# Patient Record
Sex: MALE | Race: WHITE | Employment: FULL TIME | URBAN - METROPOLITAN AREA
[De-identification: names, ages, dates, MRNs, and addresses within clinical notes are randomized per-mention and may not be internally consistent; named-entity substitution may affect disease eponyms.]

---

## 2017-01-06 DIAGNOSIS — N52.9 ERECTILE DYSFUNCTION, UNSPECIFIED ERECTILE DYSFUNCTION TYPE: ICD-10-CM

## 2017-01-09 RX ORDER — SILDENAFIL 100 MG/1
100 TABLET, FILM COATED ORAL AS NEEDED
Qty: 3 TABLET | Refills: 3 | Status: SHIPPED | OUTPATIENT
Start: 2017-01-09 | End: 2017-02-13

## 2017-01-09 NOTE — TELEPHONE ENCOUNTER
From: Monik Zacarias  To: Luisito Chiu MD  Sent: 1/6/2017 6:01 AM CST  Subject: Medication Renewal Request    Original authorizing provider: MD Monik Rao would like a refill of the following medications:  Sildenafil Citrate (VIAGRA) 100 M

## 2017-02-11 DIAGNOSIS — N52.9 ERECTILE DYSFUNCTION, UNSPECIFIED ERECTILE DYSFUNCTION TYPE: ICD-10-CM

## 2017-02-13 RX ORDER — SILDENAFIL 100 MG/1
100 TABLET, FILM COATED ORAL AS NEEDED
Qty: 3 TABLET | Refills: 3 | Status: SHIPPED | OUTPATIENT
Start: 2017-02-13 | End: 2017-04-10

## 2017-02-13 NOTE — TELEPHONE ENCOUNTER
From: Wilmer Guerrier  To: Adali Figueroa MD  Sent: 2/11/2017 10:49 AM CST  Subject: Medication Renewal Request    Original authorizing provider: MD Wilmer Regalado would like a refill of the following medications:  Sildenafil Citrate (VIAGRA) 100

## 2017-04-09 DIAGNOSIS — N52.9 ERECTILE DYSFUNCTION, UNSPECIFIED ERECTILE DYSFUNCTION TYPE: ICD-10-CM

## 2017-04-10 RX ORDER — SILDENAFIL 100 MG/1
100 TABLET, FILM COATED ORAL AS NEEDED
Qty: 3 TABLET | Refills: 3 | Status: SHIPPED | OUTPATIENT
Start: 2017-04-10 | End: 2017-07-22

## 2017-04-10 NOTE — TELEPHONE ENCOUNTER
From: Marisa Raymundo  To: Vidal Cyr MD  Sent: 4/9/2017 3:13 PM CDT  Subject: Medication Renewal Request    Original authorizing provider: MD Marisa Marquez would like a refill of the following medications:  Sildenafil Citrate (VIAGRA) 100 M

## 2017-07-22 DIAGNOSIS — N52.9 ERECTILE DYSFUNCTION, UNSPECIFIED ERECTILE DYSFUNCTION TYPE: ICD-10-CM

## 2017-07-24 NOTE — TELEPHONE ENCOUNTER
From: Devorah Quispe  Sent: 7/22/2017 10:03 AM CDT  Subject: Medication Renewal Request    Devorah Quispe would like a refill of the following medications:  Sildenafil Citrate (VIAGRA) 100 MG Oral Tab Sanna Raymundo MD]    Preferred pharmacy: [facility]

## 2017-07-25 DIAGNOSIS — N52.9 ERECTILE DYSFUNCTION, UNSPECIFIED ERECTILE DYSFUNCTION TYPE: ICD-10-CM

## 2017-07-25 RX ORDER — SILDENAFIL 100 MG/1
100 TABLET, FILM COATED ORAL AS NEEDED
Qty: 3 TABLET | Refills: 3 | Status: SHIPPED
Start: 2017-07-25 | End: 2017-09-04

## 2017-07-25 NOTE — TELEPHONE ENCOUNTER
From: Alyssa Laurent  Sent: 7/25/2017 4:49 PM CDT  Subject: Medication Renewal Request    Alyssa Dee Deeadonay would like a refill of the following medications:  Sildenafil Citrate (VIAGRA) 100 MG Oral Tab Donal Delgadillo MD]    Preferred pharmacy: [facility]

## 2017-07-26 RX ORDER — SILDENAFIL 100 MG/1
100 TABLET, FILM COATED ORAL AS NEEDED
Qty: 3 TABLET | Refills: 3
Start: 2017-07-26

## 2017-07-26 NOTE — TELEPHONE ENCOUNTER
Get prior auth form. Harris Spencer. Jalen Hopper MD  Diplomate, American Board of Internal Medicine  [facility]  [address]  [facility],4Th Floor, [address], [address]  T: [phone]; F: [address]

## 2017-08-03 ENCOUNTER — TELEPHONE (OUTPATIENT)
Dept: INTERNAL MEDICINE CLINIC | Facility: CLINIC | Age: 52
End: 2017-08-03

## 2017-09-04 DIAGNOSIS — N52.9 ERECTILE DYSFUNCTION, UNSPECIFIED ERECTILE DYSFUNCTION TYPE: ICD-10-CM

## 2017-09-05 RX ORDER — SILDENAFIL 100 MG/1
100 TABLET, FILM COATED ORAL AS NEEDED
Qty: 8 TABLET | Refills: 3 | Status: SHIPPED
Start: 2017-09-05 | End: 2018-02-17

## 2017-09-05 NOTE — TELEPHONE ENCOUNTER
From: Baljit Howell  Sent: 9/4/2017 7:49 AM CDT  Subject: Medication Renewal Request    Baljit Howell would like a refill of the following medications:  Sildenafil Citrate (VIAGRA) 100 MG Oral Tab Chika Muniz MD]    Preferred pharmacy: [facility]

## 2017-12-10 DIAGNOSIS — N52.9 ERECTILE DYSFUNCTION, UNSPECIFIED ERECTILE DYSFUNCTION TYPE: ICD-10-CM

## 2017-12-10 RX ORDER — SILDENAFIL 100 MG/1
100 TABLET, FILM COATED ORAL AS NEEDED
Qty: 8 TABLET | Refills: 3
Start: 2017-12-10

## 2018-02-17 DIAGNOSIS — N52.9 ERECTILE DYSFUNCTION, UNSPECIFIED ERECTILE DYSFUNCTION TYPE: ICD-10-CM

## 2018-02-19 RX ORDER — SILDENAFIL 100 MG/1
100 TABLET, FILM COATED ORAL AS NEEDED
Qty: 8 TABLET | Refills: 3 | Status: SHIPPED
Start: 2018-02-19 | End: 2018-05-16

## 2018-02-19 NOTE — TELEPHONE ENCOUNTER
From: Lenny Waddell  Sent: 2/17/2018 9:46 AM CST  Subject: Medication Renewal Request    Lenny Waddell would like a refill of the following medications:     Sildenafil Citrate (VIAGRA) 100 MG Oral Tab Johanny Mckenna MD]    Preferred pharmacy: [facility]

## 2018-03-08 ENCOUNTER — TELEPHONE (OUTPATIENT)
Dept: INTERNAL MEDICINE CLINIC | Facility: CLINIC | Age: 53
End: 2018-03-08

## 2018-03-08 NOTE — TELEPHONE ENCOUNTER
Call patient. I'm reviewing his generic Viagra script (Sildenafil).   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  I presume this is way more cost-effective greer

## 2018-03-12 NOTE — TELEPHONE ENCOUNTER
In my outbox. Rosa Smith. Gwendlyn Jeans, MD  Diplomate, American Board of Internal Medicine  [facility]  [address]  [address] [facility],4Th Floor, [address], [facility], [address] [place] [address]  T: [phone]; F: Frances[address] 5

## 2018-04-03 ENCOUNTER — OFFICE VISIT (OUTPATIENT)
Dept: INTERNAL MEDICINE CLINIC | Facility: CLINIC | Age: 53
End: 2018-04-03

## 2018-04-03 ENCOUNTER — LAB ENCOUNTER (OUTPATIENT)
Dept: LAB | Age: 53
End: 2018-04-03
Attending: INTERNAL MEDICINE
Payer: COMMERCIAL

## 2018-04-03 VITALS
RESPIRATION RATE: 12 BRPM | BODY MASS INDEX: 25.99 KG/M2 | HEIGHT: 69.5 IN | OXYGEN SATURATION: 97 % | WEIGHT: 179.5 LBS | DIASTOLIC BLOOD PRESSURE: 80 MMHG | SYSTOLIC BLOOD PRESSURE: 112 MMHG | HEART RATE: 60 BPM | TEMPERATURE: 98 F

## 2018-04-03 DIAGNOSIS — Z00.00 ROUTINE GENERAL MEDICAL EXAMINATION AT A HEALTH CARE FACILITY: Primary | ICD-10-CM

## 2018-04-03 DIAGNOSIS — Z00.00 ROUTINE GENERAL MEDICAL EXAMINATION AT A HEALTH CARE FACILITY: ICD-10-CM

## 2018-04-03 DIAGNOSIS — Z23 NEED FOR TETANUS, DIPHTHERIA, AND ACELLULAR PERTUSSIS (TDAP) VACCINE: ICD-10-CM

## 2018-04-03 DIAGNOSIS — L98.9 SKIN DISORDER: ICD-10-CM

## 2018-04-03 PROCEDURE — 84443 ASSAY THYROID STIM HORMONE: CPT

## 2018-04-03 PROCEDURE — 83036 HEMOGLOBIN GLYCOSYLATED A1C: CPT

## 2018-04-03 PROCEDURE — 80050 GENERAL HEALTH PANEL: CPT

## 2018-04-03 PROCEDURE — 80061 LIPID PANEL: CPT

## 2018-04-03 PROCEDURE — 90471 IMMUNIZATION ADMIN: CPT | Performed by: INTERNAL MEDICINE

## 2018-04-03 PROCEDURE — 36415 COLL VENOUS BLD VENIPUNCTURE: CPT

## 2018-04-03 PROCEDURE — 99396 PREV VISIT EST AGE 40-64: CPT | Performed by: INTERNAL MEDICINE

## 2018-04-03 PROCEDURE — 82306 VITAMIN D 25 HYDROXY: CPT

## 2018-04-03 PROCEDURE — 80053 COMPREHEN METABOLIC PANEL: CPT

## 2018-04-03 PROCEDURE — 81003 URINALYSIS AUTO W/O SCOPE: CPT

## 2018-04-03 PROCEDURE — 90715 TDAP VACCINE 7 YRS/> IM: CPT | Performed by: INTERNAL MEDICINE

## 2018-04-03 NOTE — PROGRESS NOTES
Patient presents with:  Physical      HPI: The pt presents today for male CPX. Doing well. Due for wellness labs. Health goals center around longevity, vitality, and QOL. Review of Systems   Constitutional: Negative. HENT: Negative.     Eyes: Negat DTRs  Psych - nl mood/affect  Skin - diffuse hyperpigmented moles throughout torso      A/P:  Routine general medical examination at a health care facility  (primary encounter diagnosis)  Need for tetanus, diphtheria, and acellular pertussis (tdap) vaccine

## 2018-05-16 DIAGNOSIS — N52.9 ERECTILE DYSFUNCTION, UNSPECIFIED ERECTILE DYSFUNCTION TYPE: ICD-10-CM

## 2018-05-17 RX ORDER — SILDENAFIL 100 MG/1
100 TABLET, FILM COATED ORAL AS NEEDED
Qty: 8 TABLET | Refills: 3 | Status: SHIPPED | OUTPATIENT
Start: 2018-05-17 | End: 2018-12-20

## 2018-05-17 NOTE — TELEPHONE ENCOUNTER
Refill requested: Viagra 100 mg     Failed protocol - No protocol       Last refill: 2/19/18 #8 3R  Relevant Labs: PSA 4/3/18  Last OV / RTC advised: 4/3/18 RTC in 1 year   Appt Scheduled:  No

## 2018-12-20 DIAGNOSIS — N52.9 ERECTILE DYSFUNCTION, UNSPECIFIED ERECTILE DYSFUNCTION TYPE: ICD-10-CM

## 2018-12-20 RX ORDER — SILDENAFIL 100 MG/1
100 TABLET, FILM COATED ORAL AS NEEDED
Qty: 8 TABLET | Refills: 3 | Status: SHIPPED | OUTPATIENT
Start: 2018-12-20 | End: 2019-02-06

## 2018-12-20 NOTE — TELEPHONE ENCOUNTER
Refill requested:   Requested Prescriptions     Pending Prescriptions Disp Refills   • Sildenafil Citrate (VIAGRA) 100 MG Oral Tab 8 tablet 3     Sig: Take 1 tablet (100 mg total) by mouth as needed for Erectile Dysfunction.        Failed protocol    Last r

## 2018-12-26 ENCOUNTER — TELEPHONE (OUTPATIENT)
Dept: INTERNAL MEDICINE CLINIC | Facility: CLINIC | Age: 53
End: 2018-12-26

## 2018-12-26 NOTE — TELEPHONE ENCOUNTER
Kayli Loera     Rx #: [id number]    [employer] is reviewing your PA request. Typically an electronic response will be received within 72 hours. To check for an update later, open this request from your dashboard.     You may close this dialog and return to your d

## 2019-01-02 NOTE — TELEPHONE ENCOUNTER
Request not found in Cover My Meds:    Resubmitted:    Chana Harmon (Key: [id number])   Your information has been submitted to [employer]. [employer] is reviewing the PA request and you will receive an electronic response.  You may check for the updated out

## 2019-01-08 DIAGNOSIS — N52.9 ERECTILE DYSFUNCTION, UNSPECIFIED ERECTILE DYSFUNCTION TYPE: ICD-10-CM

## 2019-01-08 RX ORDER — SILDENAFIL 100 MG/1
100 TABLET, FILM COATED ORAL AS NEEDED
Qty: 8 TABLET | Refills: 3 | Status: CANCELLED | OUTPATIENT
Start: 2019-01-08

## 2019-02-06 DIAGNOSIS — N52.9 ERECTILE DYSFUNCTION, UNSPECIFIED ERECTILE DYSFUNCTION TYPE: ICD-10-CM

## 2019-02-07 RX ORDER — SILDENAFIL 100 MG/1
100 TABLET, FILM COATED ORAL AS NEEDED
Qty: 8 TABLET | Refills: 3 | Status: SHIPPED | OUTPATIENT
Start: 2019-02-07 | End: 2020-02-25

## 2019-02-07 NOTE — TELEPHONE ENCOUNTER
Viagra 100 1 tab prn filled 12-20-18 8 tab with 3 refills     LOV 4-3-18     RTC 1 year or PRN.      Next apt 4-15-19     Labs 4-3-18

## 2019-04-15 ENCOUNTER — LAB ENCOUNTER (OUTPATIENT)
Dept: LAB | Age: 54
End: 2019-04-15
Attending: INTERNAL MEDICINE
Payer: COMMERCIAL

## 2019-04-15 ENCOUNTER — OFFICE VISIT (OUTPATIENT)
Dept: INTERNAL MEDICINE CLINIC | Facility: CLINIC | Age: 54
End: 2019-04-15

## 2019-04-15 VITALS
HEIGHT: 69.5 IN | HEART RATE: 58 BPM | SYSTOLIC BLOOD PRESSURE: 116 MMHG | WEIGHT: 171.5 LBS | TEMPERATURE: 99 F | DIASTOLIC BLOOD PRESSURE: 84 MMHG | RESPIRATION RATE: 16 BRPM | BODY MASS INDEX: 24.83 KG/M2

## 2019-04-15 DIAGNOSIS — Z00.00 ROUTINE GENERAL MEDICAL EXAMINATION AT A HEALTH CARE FACILITY: ICD-10-CM

## 2019-04-15 DIAGNOSIS — Z00.00 ROUTINE GENERAL MEDICAL EXAMINATION AT A HEALTH CARE FACILITY: Primary | ICD-10-CM

## 2019-04-15 PROCEDURE — 85025 COMPLETE CBC W/AUTO DIFF WBC: CPT

## 2019-04-15 PROCEDURE — 81003 URINALYSIS AUTO W/O SCOPE: CPT

## 2019-04-15 PROCEDURE — 36415 COLL VENOUS BLD VENIPUNCTURE: CPT

## 2019-04-15 PROCEDURE — 82306 VITAMIN D 25 HYDROXY: CPT

## 2019-04-15 PROCEDURE — 84443 ASSAY THYROID STIM HORMONE: CPT

## 2019-04-15 PROCEDURE — 80061 LIPID PANEL: CPT

## 2019-04-15 PROCEDURE — 83036 HEMOGLOBIN GLYCOSYLATED A1C: CPT

## 2019-04-15 PROCEDURE — 80053 COMPREHEN METABOLIC PANEL: CPT

## 2019-04-15 PROCEDURE — 99396 PREV VISIT EST AGE 40-64: CPT | Performed by: INTERNAL MEDICINE

## 2019-04-15 NOTE — PROGRESS NOTES
Patient presents with:  CPX      HPI: Franca Phan presents today for male CPX. Doing well. Due for wellness labs. Health goals center around longevity, vitality, and QOL. Review of Systems   All other systems reviewed and are negative.       Patient Active P Check wellness labs. 2. RTC 1 year or PRN. In the meantime, I advised getting a Heart Scan. Paolo Poe verbally agrees to and understands the plan as outlined above.   He was also afforded the time and opportunity to ask questions, which were then answered to

## 2019-08-02 ENCOUNTER — OFFICE VISIT (OUTPATIENT)
Dept: INTERNAL MEDICINE CLINIC | Facility: CLINIC | Age: 54
End: 2019-08-02

## 2019-08-02 VITALS
SYSTOLIC BLOOD PRESSURE: 118 MMHG | RESPIRATION RATE: 16 BRPM | OXYGEN SATURATION: 98 % | HEIGHT: 69.5 IN | BODY MASS INDEX: 24.29 KG/M2 | WEIGHT: 167.75 LBS | DIASTOLIC BLOOD PRESSURE: 78 MMHG | HEART RATE: 85 BPM | TEMPERATURE: 99 F

## 2019-08-02 DIAGNOSIS — J01.00 ACUTE NON-RECURRENT MAXILLARY SINUSITIS: Primary | ICD-10-CM

## 2019-08-02 PROCEDURE — 99213 OFFICE O/P EST LOW 20 MIN: CPT | Performed by: NURSE PRACTITIONER

## 2019-08-02 RX ORDER — AMOXICILLIN 875 MG/1
875 TABLET, COATED ORAL 2 TIMES DAILY
Qty: 14 TABLET | Refills: 0 | Status: SHIPPED | OUTPATIENT
Start: 2019-08-02 | End: 2019-08-09

## 2019-08-02 NOTE — PROGRESS NOTES
CHIEF COMPLAINT:   Patient presents with:  Cold: Tuesday morning woke up with sore throat and coughing up yellow mucous. Mucinex is not helping pt as well as he thought. Pt feels achy  and is sneezing and coughing.  Missed 3 days of work and will need a not no tenderness on palpation of maxillary or frontal sinuses  EYES: conjunctiva clear, EOM intact  EARS: TM's opaque, no bulging, no retraction,+ fluid, bony landmarks visible  NOSE: Nostrils patent, purulent nasal discharge, nasal mucosa edematous  THROAT:

## 2020-02-18 ENCOUNTER — TELEPHONE (OUTPATIENT)
Dept: INTERNAL MEDICINE CLINIC | Facility: CLINIC | Age: 55
End: 2020-02-18

## 2020-02-18 DIAGNOSIS — N52.9 ERECTILE DYSFUNCTION, UNSPECIFIED ERECTILE DYSFUNCTION TYPE: ICD-10-CM

## 2020-02-18 NOTE — TELEPHONE ENCOUNTER
PA request for Sildenafil 100 mg tablets.    Cover My Meds Key: [id number]    Self Pay with [facility] #30 = $19.81 @ [employer] (no membership needed)

## 2020-02-19 NOTE — TELEPHONE ENCOUNTER
Notify pt if he's acceptable w/ this. If so, then please pend medication for my signature. Deja Casiano. Renetta Lopez MD  Diplomate, American Board of Internal Medicine  Member, American College of [facility]  [address]  [address] [place] [address],[address]Th Floor, [address], [facility], [address] [place] [address]  T: [phone]; F: [address]

## 2020-02-26 RX ORDER — SILDENAFIL 100 MG/1
100 TABLET, FILM COATED ORAL AS NEEDED
Qty: 30 TABLET | Refills: 1 | Status: SHIPPED | OUTPATIENT
Start: 2020-02-26 | End: 2022-03-09

## 2020-02-26 NOTE — TELEPHONE ENCOUNTER
Order signed. Erik Richter. Yoanna Montez MD  Diplomate, American Board of Internal Medicine  Member, American College of [address] [facility]  [address]  [address] [facility],4Th Floor, [address], [address], [address]  T: [phone]; F: [address]

## 2020-04-27 ENCOUNTER — TELEPHONE (OUTPATIENT)
Dept: INTERNAL MEDICINE CLINIC | Facility: CLINIC | Age: 55
End: 2020-04-27

## 2020-04-27 NOTE — TELEPHONE ENCOUNTER
Pt started with fever Saturday 100.3 so did not go to work. Sunday 99.8  Today 99.3 cough on and off since Friday . 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  Keeps office door closed and social distancing but no masks wor

## 2020-04-27 NOTE — TELEPHONE ENCOUNTER
Wife called and stated that her  has a slight cough and a fever of 100.3. She stated that they went to [place] [address] to be tested for [address]. Patient stated that they need an order from Dr. Buffy Lagunas. Please advise.

## 2020-04-27 NOTE — TELEPHONE ENCOUNTER
Note written. Rosa Smith. Gwendlyn Jeans, MD  Diplomate, American Board of Internal Medicine  Member, American College of [address] [facility]  [address]  [address] [facility],[address]Th Floor, [address], [facility], [address] [place] [address]  T: [phone]; F: [address]

## 2020-08-14 ENCOUNTER — OFFICE VISIT (OUTPATIENT)
Dept: INTERNAL MEDICINE CLINIC | Facility: CLINIC | Age: 55
End: 2020-08-14

## 2020-08-14 VITALS
DIASTOLIC BLOOD PRESSURE: 78 MMHG | BODY MASS INDEX: 24.9 KG/M2 | HEIGHT: 69.5 IN | RESPIRATION RATE: 16 BRPM | SYSTOLIC BLOOD PRESSURE: 120 MMHG | TEMPERATURE: 99 F | HEART RATE: 78 BPM | WEIGHT: 172 LBS

## 2020-08-14 DIAGNOSIS — Z00.00 ROUTINE GENERAL MEDICAL EXAMINATION AT A HEALTH CARE FACILITY: Primary | ICD-10-CM

## 2020-08-14 DIAGNOSIS — Z12.5 SCREENING PSA (PROSTATE SPECIFIC ANTIGEN): ICD-10-CM

## 2020-08-14 LAB
ALBUMIN SERPL-MCNC: 3.9 G/DL (ref 3.4–5)
ALBUMIN/GLOB SERPL: 1.3 {RATIO} (ref 1–2)
ALP LIVER SERPL-CCNC: 50 U/L (ref 45–117)
ALT SERPL-CCNC: 28 U/L (ref 16–61)
ANION GAP SERPL CALC-SCNC: 4 MMOL/L (ref 0–18)
AST SERPL-CCNC: 19 U/L (ref 15–37)
BASOPHILS # BLD AUTO: 0.05 X10(3) UL (ref 0–0.2)
BASOPHILS NFR BLD AUTO: 0.8 %
BILIRUB SERPL-MCNC: 0.6 MG/DL (ref 0.1–2)
BUN BLD-MCNC: 12 MG/DL (ref 7–18)
BUN/CREAT SERPL: 15 (ref 10–20)
CALCIUM BLD-MCNC: 9.2 MG/DL (ref 8.5–10.1)
CHLORIDE SERPL-SCNC: 107 MMOL/L (ref 98–112)
CHOLEST SMN-MCNC: 187 MG/DL (ref ?–200)
CO2 SERPL-SCNC: 28 MMOL/L (ref 21–32)
COMPLEXED PSA SERPL-MCNC: 0.49 NG/ML (ref ?–4)
CREAT BLD-MCNC: 0.8 MG/DL (ref 0.7–1.3)
DEPRECATED RDW RBC AUTO: 41.8 FL (ref 35.1–46.3)
EOSINOPHIL # BLD AUTO: 0.13 X10(3) UL (ref 0–0.7)
EOSINOPHIL NFR BLD AUTO: 2.2 %
ERYTHROCYTE [DISTWIDTH] IN BLOOD BY AUTOMATED COUNT: 12.7 % (ref 11–15)
EST. AVERAGE GLUCOSE BLD GHB EST-MCNC: 94 MG/DL (ref 68–126)
GLOBULIN PLAS-MCNC: 3.1 G/DL (ref 2.8–4.4)
GLUCOSE BLD-MCNC: 86 MG/DL (ref 70–99)
HBA1C MFR BLD HPLC: 4.9 % (ref ?–5.7)
HCT VFR BLD AUTO: 44.1 % (ref 39–53)
HDLC SERPL-MCNC: 76 MG/DL (ref 40–59)
HGB BLD-MCNC: 14.5 G/DL (ref 13–17.5)
IMM GRANULOCYTES # BLD AUTO: 0.01 X10(3) UL (ref 0–1)
IMM GRANULOCYTES NFR BLD: 0.2 %
LDLC SERPL CALC-MCNC: 105 MG/DL (ref ?–100)
LYMPHOCYTES # BLD AUTO: 1.91 X10(3) UL (ref 1–4)
LYMPHOCYTES NFR BLD AUTO: 32.4 %
M PROTEIN MFR SERPL ELPH: 7 G/DL (ref 6.4–8.2)
MCH RBC QN AUTO: 29.5 PG (ref 26–34)
MCHC RBC AUTO-ENTMCNC: 32.9 G/DL (ref 31–37)
MCV RBC AUTO: 89.8 FL (ref 80–100)
MONOCYTES # BLD AUTO: 0.43 X10(3) UL (ref 0.1–1)
MONOCYTES NFR BLD AUTO: 7.3 %
NEUTROPHILS # BLD AUTO: 3.36 X10 (3) UL (ref 1.5–7.7)
NEUTROPHILS # BLD AUTO: 3.36 X10(3) UL (ref 1.5–7.7)
NEUTROPHILS NFR BLD AUTO: 57.1 %
NONHDLC SERPL-MCNC: 111 MG/DL (ref ?–130)
OSMOLALITY SERPL CALC.SUM OF ELEC: 287 MOSM/KG (ref 275–295)
PATIENT FASTING Y/N/NP: YES
PATIENT FASTING Y/N/NP: YES
PLATELET # BLD AUTO: 276 10(3)UL (ref 150–450)
POTASSIUM SERPL-SCNC: 3.9 MMOL/L (ref 3.5–5.1)
RBC # BLD AUTO: 4.91 X10(6)UL (ref 4.3–5.7)
SODIUM SERPL-SCNC: 139 MMOL/L (ref 136–145)
TRIGL SERPL-MCNC: 32 MG/DL (ref 30–149)
TSI SER-ACNC: 1.39 MIU/ML (ref 0.36–3.74)
VLDLC SERPL CALC-MCNC: 6 MG/DL (ref 0–30)
WBC # BLD AUTO: 5.9 X10(3) UL (ref 4–11)

## 2020-08-14 PROCEDURE — 85025 COMPLETE CBC W/AUTO DIFF WBC: CPT | Performed by: INTERNAL MEDICINE

## 2020-08-14 PROCEDURE — 80061 LIPID PANEL: CPT | Performed by: INTERNAL MEDICINE

## 2020-08-14 PROCEDURE — 99396 PREV VISIT EST AGE 40-64: CPT | Performed by: INTERNAL MEDICINE

## 2020-08-14 PROCEDURE — 83036 HEMOGLOBIN GLYCOSYLATED A1C: CPT | Performed by: INTERNAL MEDICINE

## 2020-08-14 PROCEDURE — 80053 COMPREHEN METABOLIC PANEL: CPT | Performed by: INTERNAL MEDICINE

## 2020-08-14 PROCEDURE — 3078F DIAST BP <80 MM HG: CPT | Performed by: INTERNAL MEDICINE

## 2020-08-14 PROCEDURE — 84443 ASSAY THYROID STIM HORMONE: CPT | Performed by: INTERNAL MEDICINE

## 2020-08-14 PROCEDURE — 3074F SYST BP LT 130 MM HG: CPT | Performed by: INTERNAL MEDICINE

## 2020-08-14 PROCEDURE — 3008F BODY MASS INDEX DOCD: CPT | Performed by: INTERNAL MEDICINE

## 2020-08-14 NOTE — PROGRESS NOTES
Patient presents with:  Physical: Pt had coffee with cream.       HPI: Orlando Muller presents today for male CPX. Stable health. Due for wellness labs. Health goals center around longevity, vitality, and QOL. He is planning on getting a Heart Scan soon.     Review o mood/affect      A/P:  Routine general medical examination at a health care facility  (primary encounter diagnosis)  Screening psa (prostate specific antigen)    1. Male CPX - Stable health. Check wellness labs. 2. HM/Prev - Check PSA.   3. RTC 1 year or P

## 2022-03-09 ENCOUNTER — OFFICE VISIT (OUTPATIENT)
Dept: INTERNAL MEDICINE CLINIC | Facility: CLINIC | Age: 57
End: 2022-03-09
Payer: COMMERCIAL

## 2022-03-09 VITALS
DIASTOLIC BLOOD PRESSURE: 68 MMHG | HEIGHT: 69 IN | SYSTOLIC BLOOD PRESSURE: 118 MMHG | BODY MASS INDEX: 25.42 KG/M2 | OXYGEN SATURATION: 99 % | HEART RATE: 54 BPM | TEMPERATURE: 98 F | WEIGHT: 171.63 LBS | RESPIRATION RATE: 16 BRPM

## 2022-03-09 DIAGNOSIS — E78.00 HYPERCHOLESTEROLEMIA: ICD-10-CM

## 2022-03-09 DIAGNOSIS — Z12.5 SCREENING FOR MALIGNANT NEOPLASM OF PROSTATE: ICD-10-CM

## 2022-03-09 DIAGNOSIS — N52.9 ERECTILE DYSFUNCTION, UNSPECIFIED ERECTILE DYSFUNCTION TYPE: ICD-10-CM

## 2022-03-09 DIAGNOSIS — Z00.00 ENCOUNTER FOR PREVENTATIVE ADULT HEALTH CARE EXAMINATION: Primary | ICD-10-CM

## 2022-03-09 PROCEDURE — 99396 PREV VISIT EST AGE 40-64: CPT | Performed by: INTERNAL MEDICINE

## 2022-03-09 PROCEDURE — 3074F SYST BP LT 130 MM HG: CPT | Performed by: INTERNAL MEDICINE

## 2022-03-09 PROCEDURE — 3078F DIAST BP <80 MM HG: CPT | Performed by: INTERNAL MEDICINE

## 2022-03-09 PROCEDURE — 3008F BODY MASS INDEX DOCD: CPT | Performed by: INTERNAL MEDICINE

## 2022-03-09 RX ORDER — SILDENAFIL 100 MG/1
100 TABLET, FILM COATED ORAL AS NEEDED
Qty: 30 TABLET | Refills: 2 | Status: SHIPPED | OUTPATIENT
Start: 2022-03-09

## 2022-03-09 NOTE — PATIENT INSTRUCTIONS
- Get blood work done at [facility] when fasting (water and medications only for 8 hours)  - Sildenafil refill sent to [facility]  - Per the records you are not due for another colonoscopy until 2025. Will send you a message if we find out otherwise/if you need an earlier colonoscopy. - Follow up in 1 year for physical/chronic issues; follow up earlier as needed. It was a pleasure seeing you in the clinic today. Thank you for choosing the [facility] office for your healthcare needs. Please call at [phone] with any questions or concerns.     Dave Spencer MD

## 2022-03-13 LAB
ABSOLUTE BASOPHILS: 62 CELLS/UL (ref 0–200)
ABSOLUTE EOSINOPHILS: 156 CELLS/UL (ref 15–500)
ABSOLUTE LYMPHOCYTES: 1646 CELLS/UL (ref 850–3900)
ABSOLUTE MONOCYTES: 577 CELLS/UL (ref 200–950)
ABSOLUTE NEUTROPHILS: 5359 CELLS/UL (ref 1500–7800)
ALBUMIN/GLOBULIN RATIO: 1.4 (CALC) (ref 1–2.5)
ALBUMIN: 3.9 G/DL (ref 3.6–5.1)
ALKALINE PHOSPHATASE: 57 U/L (ref 35–144)
ALT: 19 U/L (ref 9–46)
AST: 17 U/L (ref 10–35)
BASOPHILS: 0.8 %
BILIRUBIN, TOTAL: 0.5 MG/DL (ref 0.2–1.2)
BUN: 18 MG/DL (ref 7–25)
CALCIUM: 9.5 MG/DL (ref 8.6–10.3)
CARBON DIOXIDE: 29 MMOL/L (ref 20–32)
CHLORIDE: 105 MMOL/L (ref 98–110)
CHOL/HDLC RATIO: 2.3 (CALC)
CHOLESTEROL, TOTAL: 178 MG/DL
CREATININE: 0.75 MG/DL (ref 0.7–1.33)
EGFR IF AFRICN AM: 119 ML/MIN/1.73M2
EGFR IF NONAFRICN AM: 103 ML/MIN/1.73M2
EOSINOPHILS: 2 %
GLOBULIN: 2.7 G/DL (CALC) (ref 1.9–3.7)
GLUCOSE: 87 MG/DL (ref 65–99)
HDL CHOLESTEROL: 78 MG/DL
HEMATOCRIT: 44 % (ref 38.5–50)
HEMOGLOBIN A1C: 4.8 % OF TOTAL HGB
HEMOGLOBIN: 14.7 G/DL (ref 13.2–17.1)
LDL-CHOLESTEROL: 90 MG/DL (CALC)
LYMPHOCYTES: 21.1 %
MCH: 29.2 PG (ref 27–33)
MCHC: 33.4 G/DL (ref 32–36)
MCV: 87.3 FL (ref 80–100)
MONOCYTES: 7.4 %
MPV: 9.7 FL (ref 7.5–12.5)
NEUTROPHILS: 68.7 %
NON-HDL CHOLESTEROL: 100 MG/DL (CALC)
PLATELET COUNT: 291 THOUSAND/UL (ref 140–400)
POTASSIUM: 5 MMOL/L (ref 3.5–5.3)
PROTEIN, TOTAL: 6.6 G/DL (ref 6.1–8.1)
PSA, TOTAL: 0.53 NG/ML
RDW: 12.7 % (ref 11–15)
RED BLOOD CELL COUNT: 5.04 MILLION/UL (ref 4.2–5.8)
SODIUM: 138 MMOL/L (ref 135–146)
TRIGLYCERIDES: 35 MG/DL
TSH W/REFLEX TO FT4: 1.05 MIU/L (ref 0.4–4.5)
WHITE BLOOD CELL COUNT: 7.8 THOUSAND/UL (ref 3.8–10.8)

## 2022-12-27 ENCOUNTER — OFFICE VISIT (OUTPATIENT)
Dept: INTERNAL MEDICINE CLINIC | Facility: CLINIC | Age: 57
End: 2022-12-27
Payer: COMMERCIAL

## 2022-12-27 VITALS
DIASTOLIC BLOOD PRESSURE: 70 MMHG | HEIGHT: 69 IN | SYSTOLIC BLOOD PRESSURE: 120 MMHG | HEART RATE: 88 BPM | RESPIRATION RATE: 16 BRPM | OXYGEN SATURATION: 98 % | BODY MASS INDEX: 26.27 KG/M2 | WEIGHT: 177.38 LBS | TEMPERATURE: 99 F

## 2022-12-27 DIAGNOSIS — K62.9 RECTAL LESION: Primary | ICD-10-CM

## 2022-12-27 DIAGNOSIS — R19.4 CHANGE IN BOWEL HABITS: ICD-10-CM

## 2022-12-27 PROCEDURE — 3008F BODY MASS INDEX DOCD: CPT | Performed by: INTERNAL MEDICINE

## 2022-12-27 PROCEDURE — 99213 OFFICE O/P EST LOW 20 MIN: CPT | Performed by: INTERNAL MEDICINE

## 2022-12-27 PROCEDURE — 3074F SYST BP LT 130 MM HG: CPT | Performed by: INTERNAL MEDICINE

## 2022-12-27 PROCEDURE — 3078F DIAST BP <80 MM HG: CPT | Performed by: INTERNAL MEDICINE

## 2022-12-27 NOTE — PATIENT INSTRUCTIONS
- You do have a possible skin tag in the right side of the rectal area  - I do also see a small hole in the skin below this  - I would like you to follow up with the GI specialists to discuss these findings as well as your frequent bowel movements. Try [facility] first. They should call you, otherwise if you have not heard from them within a week give them a call:  [address]  [place], [address]  [address]  Phone: [phone]    - If they cannot see you in the next month or two try [facility]:  Dr. Javier Pederson, others  [address]  [address], [address] [address]  [phone]    You could also try [facility]  Dr. Cristina Larson [address]  [place] [address]  [phone]    It was a pleasure seeing you in the clinic today. Thank you for choosing the [facility] office for your healthcare needs. Please call at [phone] with any questions or concerns.     Lizz Caldwell MD

## 2023-02-10 ENCOUNTER — HOSPITAL ENCOUNTER (OUTPATIENT)
Dept: GENERAL RADIOLOGY | Age: 58
Discharge: HOME | End: 2023-02-10
Attending: INTERNAL MEDICINE
Payer: COMMERCIAL

## 2023-02-10 DIAGNOSIS — R19.8 CHANGE IN BOWEL FUNCTION: ICD-10-CM

## 2023-02-10 DIAGNOSIS — R19.5 LOOSE STOOLS: ICD-10-CM

## 2023-02-10 PROBLEM — K64.9 HEMORRHOIDS: Status: ACTIVE | Noted: 2023-02-10

## 2023-02-10 PROCEDURE — 74018 RADEX ABDOMEN 1 VIEW: CPT | Performed by: INTERNAL MEDICINE

## 2023-02-16 PROBLEM — K55.20 ANGIODYSPLASIA OF COLON WITHOUT HEMORRHAGE: Status: ACTIVE | Noted: 2023-02-16

## 2023-02-16 PROBLEM — K92.1 HEMATOCHEZIA: Status: ACTIVE | Noted: 2023-02-16

## 2023-02-16 PROBLEM — R19.4 CHANGE IN BOWEL HABITS: Status: ACTIVE | Noted: 2023-02-16

## 2023-02-16 PROBLEM — R19.7 DIARRHEA: Status: ACTIVE | Noted: 2023-02-16

## 2023-02-16 PROBLEM — K62.89 PROCTITIS: Status: ACTIVE | Noted: 2023-02-16

## 2023-02-16 PROBLEM — K64.8 INTERNAL HEMORRHOIDS: Status: ACTIVE | Noted: 2023-02-16

## 2023-02-23 ENCOUNTER — PATIENT MESSAGE (OUTPATIENT)
Dept: INTERNAL MEDICINE CLINIC | Facility: CLINIC | Age: 58
End: 2023-02-23

## 2023-02-23 DIAGNOSIS — K62.9 RECTAL LESION: Primary | ICD-10-CM

## 2023-02-23 NOTE — TELEPHONE ENCOUNTER
From: Siddhartha Lorenz  To: Anjum King MD  Sent: 2/23/2023 4:08 PM CST  Subject: Follow up     Hi Dr. Nima Christianson,  The results of my colonoscopy have come back. While it did explain many of my symptoms, it did not explain the lump which I originally saw you for. Dr. Shawna Diaz said he was unsure what it was, possibly a boil or ingrown hair. He said I should follow up with you. I wasn't sure how to proceed from here. Should I make another appointment with you, or do you need to refer me to a different type of specialist? Please let me know what I should do.    Thank you,   Raymond Yi

## 2023-03-08 ENCOUNTER — PATIENT MESSAGE (OUTPATIENT)
Dept: INTERNAL MEDICINE CLINIC | Facility: CLINIC | Age: 58
End: 2023-03-08

## 2023-03-08 ENCOUNTER — TELEPHONE (OUTPATIENT)
Dept: INTERNAL MEDICINE CLINIC | Facility: CLINIC | Age: 58
End: 2023-03-08

## 2023-03-08 ENCOUNTER — APPOINTMENT (OUTPATIENT)
Dept: CT IMAGING | Age: 58
End: 2023-03-08
Attending: NURSE PRACTITIONER
Payer: COMMERCIAL

## 2023-03-08 ENCOUNTER — HOSPITAL ENCOUNTER (OUTPATIENT)
Age: 58
Discharge: HOME | End: 2023-03-08
Payer: COMMERCIAL

## 2023-03-08 VITALS
OXYGEN SATURATION: 99 % | SYSTOLIC BLOOD PRESSURE: 131 MMHG | DIASTOLIC BLOOD PRESSURE: 76 MMHG | HEART RATE: 72 BPM | BODY MASS INDEX: 24 KG/M2 | TEMPERATURE: 98 F | RESPIRATION RATE: 18 BRPM | WEIGHT: 165 LBS

## 2023-03-08 DIAGNOSIS — L03.317 CELLULITIS, GLUTEAL, RIGHT: Primary | ICD-10-CM

## 2023-03-08 LAB
#MXD IC: 1.3 X10ˆ3/UL (ref 0.1–1)
BUN BLD-MCNC: 12 MG/DL (ref 7–18)
CHLORIDE BLD-SCNC: 104 MMOL/L (ref 98–112)
CO2 BLD-SCNC: 25 MMOL/L (ref 21–32)
CREAT BLD-MCNC: 0.7 MG/DL
GFR SERPLBLD BASED ON 1.73 SQ M-ARVRAT: 107 ML/MIN/1.73M2 (ref 60–?)
GLUCOSE BLD-MCNC: 104 MG/DL (ref 70–99)
HCT VFR BLD AUTO: 42.6 %
HCT VFR BLD CALC: 43 %
HGB BLD-MCNC: 14 G/DL
ISTAT IONIZED CALCIUM FOR CHEM 8: 1.25 MMOL/L (ref 1.12–1.32)
LYMPHOCYTES # BLD AUTO: 1.4 X10ˆ3/UL (ref 1–4)
LYMPHOCYTES NFR BLD AUTO: 19.2 %
MCH RBC QN AUTO: 28.8 PG (ref 26–34)
MCHC RBC AUTO-ENTMCNC: 32.9 G/DL (ref 31–37)
MCV RBC AUTO: 87.7 FL (ref 80–100)
MIXED CELL %: 16.7 %
NEUTROPHILS # BLD AUTO: 4.8 X10ˆ3/UL (ref 1.5–7.7)
NEUTROPHILS NFR BLD AUTO: 64.1 %
PLATELET # BLD AUTO: 333 X10ˆ3/UL (ref 150–450)
POTASSIUM BLD-SCNC: 3.9 MMOL/L (ref 3.6–5.1)
RBC # BLD AUTO: 4.86 X10ˆ6/UL
SODIUM BLD-SCNC: 139 MMOL/L (ref 136–145)
WBC # BLD AUTO: 7.5 X10ˆ3/UL (ref 4–11)

## 2023-03-08 PROCEDURE — 96360 HYDRATION IV INFUSION INIT: CPT

## 2023-03-08 PROCEDURE — 99215 OFFICE O/P EST HI 40 MIN: CPT

## 2023-03-08 PROCEDURE — 96361 HYDRATE IV INFUSION ADD-ON: CPT

## 2023-03-08 PROCEDURE — 80047 BASIC METABLC PNL IONIZED CA: CPT

## 2023-03-08 PROCEDURE — 72193 CT PELVIS W/DYE: CPT | Performed by: NURSE PRACTITIONER

## 2023-03-08 PROCEDURE — 99205 OFFICE O/P NEW HI 60 MIN: CPT

## 2023-03-08 PROCEDURE — 85025 COMPLETE CBC W/AUTO DIFF WBC: CPT | Performed by: NURSE PRACTITIONER

## 2023-03-08 RX ORDER — SODIUM CHLORIDE 9 MG/ML
1000 INJECTION, SOLUTION INTRAVENOUS ONCE
Status: COMPLETED | OUTPATIENT
Start: 2023-03-08 | End: 2023-03-08

## 2023-03-08 RX ORDER — AMOXICILLIN AND CLAVULANATE POTASSIUM 875; 125 MG/1; MG/1
1 TABLET, FILM COATED ORAL 2 TIMES DAILY
Qty: 20 TABLET | Refills: 0 | Status: SHIPPED | OUTPATIENT
Start: 2023-03-08 | End: 2023-03-13

## 2023-03-08 NOTE — TELEPHONE ENCOUNTER
From: Juan Alberto   To: David Carlisle MD  Sent: 3/8/2023 2:02 PM CST  Subject: [place]-rectal surgeon referral     Hi Dr. Vera Counts,   I need to make an appointment with Dr. Halley Wallace, however I called my insurance company to check on the referral that you gave me for him on February 27th, and according to them there is no referral on file. I had to go to urgent care today, as the lump near my anus ruptured and was bleeding. I had a CT scan and it was diagnosed as cellulitis. The urgent care doctor wants me to follow up with the surgeon as soon as possible. She spoke to Carloz Adbul from your office today, who told her that the referral is in the system. However, according to [facility], that is not the case. Could you check to see if the referral was submitted properly? We have had problems in the past with [facility] not accepting referrals that were not submitted through their electronic portal. Thank you very much.    Madison Wills

## 2023-03-08 NOTE — ED INITIAL ASSESSMENT (HPI)
Right buttock- C/o bump on the anal area, states it ruptured this morning and is bleeding. Per pt it started around December. Pt called his GI doctor and was advised to see . pt waiting for approval to see the surgeon.  Pt has appt with GI doctor on Friday

## 2023-03-08 NOTE — TELEPHONE ENCOUNTER
Polina Nunez from [facility] called in stating she was treating pt today for rectal lesion. She stated abscess is large and draining now. She was asking if referral for GI, Dr. Steven Nguyễn, was authorized. Informed her it was authorized and effective 2/27/23 to 2/27/24. She stated she will tell him to be seen by them ASAP and if he has any issues getting into them, she will have him contact us.      HARRISON

## 2023-03-08 NOTE — DISCHARGE INSTRUCTIONS
Antibiotic as directed. Take a probiotic as well. Close follow-up with surgery. Warm soaks to area every 2-3 hours for 15 to 20 minutes. If any fever, increased swelling, or worsening symptoms go to the emergency room.

## 2023-03-09 ENCOUNTER — TELEPHONE (OUTPATIENT)
Facility: LOCATION | Age: 58
End: 2023-03-09

## 2023-03-09 NOTE — TELEPHONE ENCOUNTER
Called patient, left message to call the office to make a follow up appointment with Dr. Reynaldo Marx.

## 2023-03-10 ENCOUNTER — HOSPITAL ENCOUNTER (INPATIENT)
Facility: HOSPITAL | Age: 58
LOS: 3 days | Discharge: HOME | End: 2023-03-13
Attending: EMERGENCY MEDICINE | Admitting: STUDENT IN AN ORGANIZED HEALTH CARE EDUCATION/TRAINING PROGRAM
Payer: COMMERCIAL

## 2023-03-10 DIAGNOSIS — K51.90 EXACERBATION OF ULCERATIVE COLITIS WITHOUT COMPLICATION (HCC): Primary | ICD-10-CM

## 2023-03-10 LAB
ALBUMIN SERPL-MCNC: 3.4 G/DL (ref 3.4–5)
ALBUMIN/GLOB SERPL: 0.9 {RATIO} (ref 1–2)
ALP LIVER SERPL-CCNC: 65 U/L
ALT SERPL-CCNC: 27 U/L
ANION GAP SERPL CALC-SCNC: 2 MMOL/L (ref 0–18)
AST SERPL-CCNC: 16 U/L (ref 15–37)
BASOPHILS # BLD AUTO: 0.07 X10(3) UL (ref 0–0.2)
BASOPHILS NFR BLD AUTO: 1 %
BILIRUB SERPL-MCNC: 0.4 MG/DL (ref 0.1–2)
BUN BLD-MCNC: 14 MG/DL (ref 7–18)
C DIFF TOX B STL QL: NEGATIVE
CALCIUM BLD-MCNC: 9.2 MG/DL (ref 8.5–10.1)
CHLORIDE SERPL-SCNC: 109 MMOL/L (ref 98–112)
CO2 SERPL-SCNC: 27 MMOL/L (ref 21–32)
CREAT BLD-MCNC: 0.88 MG/DL
CRP SERPL-MCNC: 4.4 MG/DL (ref ?–0.3)
EOSINOPHIL # BLD AUTO: 0.33 X10(3) UL (ref 0–0.7)
EOSINOPHIL NFR BLD AUTO: 4.9 %
ERYTHROCYTE [DISTWIDTH] IN BLOOD BY AUTOMATED COUNT: 13 %
GFR SERPLBLD BASED ON 1.73 SQ M-ARVRAT: 100 ML/MIN/1.73M2 (ref 60–?)
GLOBULIN PLAS-MCNC: 3.7 G/DL (ref 2.8–4.4)
GLUCOSE BLD-MCNC: 109 MG/DL (ref 70–99)
HCT VFR BLD AUTO: 45.9 %
HGB BLD-MCNC: 15.5 G/DL
IMM GRANULOCYTES # BLD AUTO: 0.06 X10(3) UL (ref 0–1)
IMM GRANULOCYTES NFR BLD: 0.9 %
LIPASE SERPL-CCNC: 313 U/L (ref 73–393)
LIPASE SERPL-CCNC: 86 U/L (ref 13–75)
LYMPHOCYTES # BLD AUTO: 1.33 X10(3) UL (ref 1–4)
LYMPHOCYTES NFR BLD AUTO: 19.8 %
MCH RBC QN AUTO: 29.3 PG (ref 26–34)
MCHC RBC AUTO-ENTMCNC: 33.8 G/DL (ref 31–37)
MCV RBC AUTO: 86.8 FL
MONOCYTES # BLD AUTO: 0.71 X10(3) UL (ref 0.1–1)
MONOCYTES NFR BLD AUTO: 10.5 %
NEUTROPHILS # BLD AUTO: 4.23 X10 (3) UL (ref 1.5–7.7)
NEUTROPHILS # BLD AUTO: 4.23 X10(3) UL (ref 1.5–7.7)
NEUTROPHILS NFR BLD AUTO: 62.9 %
OSMOLALITY SERPL CALC.SUM OF ELEC: 287 MOSM/KG (ref 275–295)
PLATELET # BLD AUTO: 347 10(3)UL (ref 150–450)
POTASSIUM SERPL-SCNC: 3.8 MMOL/L (ref 3.5–5.1)
PROT SERPL-MCNC: 7.1 G/DL (ref 6.4–8.2)
RBC # BLD AUTO: 5.29 X10(6)UL
SARS-COV-2 RNA RESP QL NAA+PROBE: NOT DETECTED
SODIUM SERPL-SCNC: 138 MMOL/L (ref 136–145)
WBC # BLD AUTO: 6.7 X10(3) UL (ref 4–11)

## 2023-03-10 PROCEDURE — 99223 1ST HOSP IP/OBS HIGH 75: CPT | Performed by: STUDENT IN AN ORGANIZED HEALTH CARE EDUCATION/TRAINING PROGRAM

## 2023-03-10 RX ORDER — POLYETHYLENE GLYCOL 3350 17 G/17G
17 POWDER, FOR SOLUTION ORAL DAILY PRN
Status: DISCONTINUED | OUTPATIENT
Start: 2023-03-10 | End: 2023-03-11

## 2023-03-10 RX ORDER — ECHINACEA PURPUREA EXTRACT 125 MG
1 TABLET ORAL
Status: DISCONTINUED | OUTPATIENT
Start: 2023-03-10 | End: 2023-03-13

## 2023-03-10 RX ORDER — PROCHLORPERAZINE EDISYLATE 5 MG/ML
5 INJECTION INTRAMUSCULAR; INTRAVENOUS EVERY 8 HOURS PRN
Status: DISCONTINUED | OUTPATIENT
Start: 2023-03-10 | End: 2023-03-13

## 2023-03-10 RX ORDER — ONDANSETRON 2 MG/ML
4 INJECTION INTRAMUSCULAR; INTRAVENOUS EVERY 6 HOURS PRN
Status: DISCONTINUED | OUTPATIENT
Start: 2023-03-10 | End: 2023-03-13

## 2023-03-10 RX ORDER — ENOXAPARIN SODIUM 100 MG/ML
40 INJECTION SUBCUTANEOUS DAILY
Status: DISCONTINUED | OUTPATIENT
Start: 2023-03-10 | End: 2023-03-13

## 2023-03-10 RX ORDER — BENZONATATE 200 MG/1
200 CAPSULE ORAL 3 TIMES DAILY PRN
Status: DISCONTINUED | OUTPATIENT
Start: 2023-03-10 | End: 2023-03-13

## 2023-03-10 RX ORDER — BISACODYL 10 MG
10 SUPPOSITORY, RECTAL RECTAL
Status: DISCONTINUED | OUTPATIENT
Start: 2023-03-10 | End: 2023-03-13

## 2023-03-10 RX ORDER — MELATONIN
3 NIGHTLY PRN
Status: DISCONTINUED | OUTPATIENT
Start: 2023-03-10 | End: 2023-03-13

## 2023-03-10 RX ORDER — ACETAMINOPHEN 500 MG
1000 TABLET ORAL EVERY 8 HOURS PRN
Status: DISCONTINUED | OUTPATIENT
Start: 2023-03-10 | End: 2023-03-13

## 2023-03-10 RX ORDER — SENNOSIDES 8.6 MG
17.2 TABLET ORAL NIGHTLY PRN
Status: DISCONTINUED | OUTPATIENT
Start: 2023-03-10 | End: 2023-03-11

## 2023-03-10 RX ORDER — MESALAMINE 1.2 G/1
2.4 TABLET, DELAYED RELEASE ORAL DAILY
Status: DISCONTINUED | OUTPATIENT
Start: 2023-03-11 | End: 2023-03-10 | Stop reason: RX

## 2023-03-10 RX ORDER — SODIUM CHLORIDE, SODIUM LACTATE, POTASSIUM CHLORIDE, CALCIUM CHLORIDE 600; 310; 30; 20 MG/100ML; MG/100ML; MG/100ML; MG/100ML
INJECTION, SOLUTION INTRAVENOUS CONTINUOUS
Status: DISCONTINUED | OUTPATIENT
Start: 2023-03-10 | End: 2023-03-12

## 2023-03-10 RX ORDER — MESALAMINE 400 MG/1
2400 CAPSULE, DELAYED RELEASE ORAL
Status: DISCONTINUED | OUTPATIENT
Start: 2023-03-10 | End: 2023-03-11

## 2023-03-10 NOTE — ED QUICK NOTES
Orders for admission, patient is aware of plan and ready to go upstairs. Any questions, please call ED RN Amanda at extension [phone].      Patient Covid vaccination status: Fully vaccinated     COVID Test Ordered in ED: Rapid SARS-CoV-2 by PCR    COVID Suspicion at Admission: Low clinical suspicion for COVID    Running Infusions:  0.9 NS IVF per [facility]    Mental Status/LOC at time of transport: A&Ox3    Other pertinent information:   CIWA score: N/A   NIH score:  N/A

## 2023-03-10 NOTE — PLAN OF CARE
Problem: Patient/Family Goals  Goal: Patient/Family Long Term Goal  Description: Patient's Long Term Goal: Discharge with adequate resources    Interventions:  - Identify barriers to discharge w/pt and caregiver  - Include patient/family/discharge partner in discharge planning  - Arrange for needed discharge resources and transportation as appropriate  - Identify discharge learning needs   - Consider post-discharge preferences of patient/family/discharge partner  - Assess patient's ability to be responsible for managing their own health  - Refer to Case Management Department for coordinating discharge planning if the patient needs post-hospital services   - See additional Care Plan goals for specific interventions  Outcome: Progressing  Goal: Patient/Family Short Term Goal  Description: Patient's Short Term Goal:   3/10: remain comfortable    Interventions:   - medications per [facility]  - frequent rounding   - See additional Care Plan goals for specific interventions  Outcome: Progressing     Problem: PAIN - ADULT  Goal: Verbalizes/displays adequate comfort level or patient's stated pain goal  Description: INTERVENTIONS:  - Encourage pt to monitor pain and request assistance  - Assess pain using appropriate pain scale  - Administer analgesics based on type and severity of pain and evaluate response  - Implement non-pharmacological measures as appropriate and evaluate response  - Consider cultural and social influences on pain and pain management  - Manage/alleviate anxiety  - Utilize distraction and/or relaxation techniques  - Monitor for opioid side effects  - Notify MD/LIP if interventions unsuccessful or patient reports new pain  - Anticipate increased pain with activity and pre-medicate as appropriate  Outcome: Progressing     Problem: RISK FOR INFECTION - ADULT  Goal: Absence of fever/infection during anticipated neutropenic period  Description: INTERVENTIONS  - Monitor WBC  - Administer growth factors as ordered  - Implement neutropenic guidelines  Outcome: Progressing     Problem: SAFETY ADULT - FALL  Goal: Free from fall injury  Description: INTERVENTIONS:  - Assess pt frequently for physical needs  - Identify cognitive and physical deficits and behaviors that affect risk of falls.   - Sylvester fall precautions as indicated by assessment.  - Educate pt/family on patient safety including physical limitations  - Instruct pt to call for assistance with activity based on assessment  - Modify environment to reduce risk of injury  - Provide assistive devices as appropriate  - Consider OT/PT consult to assist with strengthening/mobility  - Encourage toileting schedule  Outcome: Progressing     Problem: DISCHARGE PLANNING  Goal: Discharge to home or other facility with appropriate resources  Description: INTERVENTIONS:  - Identify barriers to discharge w/pt and caregiver  - Include patient/family/discharge partner in discharge planning  - Arrange for needed discharge resources and transportation as appropriate  - Identify discharge learning needs (meds, wound care, etc)  - Arrange for interpreters to assist at discharge as needed  - Consider post-discharge preferences of patient/family/discharge partner  - Complete POLST form as appropriate  - Assess patient's ability to be responsible for managing their own health  - Refer to Case Management Department for coordinating discharge planning if the patient needs post-hospital services based on physician/LIP order or complex needs related to functional status, cognitive ability or social support system  Outcome: Progressing     Problem: GASTROINTESTINAL - ADULT  Goal: Minimal or absence of nausea and vomiting  Description: INTERVENTIONS:  - Maintain adequate hydration with IV or PO as ordered and tolerated  - Nasogastric tube to low intermittent suction as ordered  - Evaluate effectiveness of ordered antiemetic medications  - Provide nonpharmacologic comfort measures as appropriate  - Advance diet as tolerated, if ordered  - Obtain nutritional consult as needed  - Evaluate fluid balance  Outcome: Progressing  Goal: Maintains or returns to baseline bowel function  Description: INTERVENTIONS:  - Assess bowel function  - Maintain adequate hydration with IV or PO as ordered and tolerated  - Evaluate effectiveness of GI medications  - Encourage mobilization and activity  - Obtain nutritional consult as needed  - Establish a toileting routine/schedule  - Consider collaborating with pharmacy to review patient's medication profile  Outcome: Progressing  Goal: Maintains adequate nutritional intake (undernourished)  Description: INTERVENTIONS:  - Monitor percentage of each meal consumed  - Identify factors contributing to decreased intake, treat as appropriate  - Assist with meals as needed  - Monitor I&O, WT and lab values  - Obtain nutritional consult as needed  - Optimize oral hygiene and moisture  - Encourage food from home; allow for food preferences  - Enhance eating environment  Outcome: Progressing

## 2023-03-10 NOTE — PROGRESS NOTES
NURSING ADMISSION NOTE      Patient admitted via Ambulatory  Oriented to room. Safety precautions initiated. Bed in low position. Call light in reach. Received pt at 1530. Pt is A&Ox4, wears glasses. Room air, continuous pulse oxygen, O2 sating WNL. Tele-NSR. No complaints of pain. Pt reports that when he has to go to the bathroom, cramping occurs but only for a few minutes. SBA. Wife at bedside. MD at bedside. Pt updated on POC, all questions and concerns addressed, Safety precautions in place, no further needs at this time.

## 2023-03-10 NOTE — ED INITIAL ASSESSMENT (HPI)
Patient sent to ER by GI, Dr. Thomas Dozier, for worsening UC symptoms. C/o abdominal cramps, bloody diarrhea, 10-15x/day. Denies dizziness, SOB. Currently on Augmentin.

## 2023-03-11 LAB
ADENOVIRUS F 40/41 PCR: NEGATIVE
ALBUMIN SERPL-MCNC: 2.9 G/DL (ref 3.4–5)
ALBUMIN/GLOB SERPL: 0.9 {RATIO} (ref 1–2)
ALP LIVER SERPL-CCNC: 56 U/L
ALT SERPL-CCNC: 24 U/L
ANION GAP SERPL CALC-SCNC: 4 MMOL/L (ref 0–18)
AST SERPL-CCNC: 11 U/L (ref 15–37)
ASTROVIRUS PCR: NEGATIVE
BASOPHILS # BLD AUTO: 0.07 X10(3) UL (ref 0–0.2)
BASOPHILS NFR BLD AUTO: 1 %
BILIRUB SERPL-MCNC: 0.4 MG/DL (ref 0.1–2)
BUN BLD-MCNC: 12 MG/DL (ref 7–18)
C CAYETANENSIS DNA SPEC QL NAA+PROBE: NEGATIVE
CALCIUM BLD-MCNC: 9.1 MG/DL (ref 8.5–10.1)
CAMPY SP DNA.DIARRHEA STL QL NAA+PROBE: NEGATIVE
CHLORIDE SERPL-SCNC: 109 MMOL/L (ref 98–112)
CO2 SERPL-SCNC: 26 MMOL/L (ref 21–32)
CREAT BLD-MCNC: 0.7 MG/DL
CRP SERPL-MCNC: 2.47 MG/DL (ref ?–0.3)
CRYPTOSP DNA SPEC QL NAA+PROBE: NEGATIVE
EAEC PAA PLAS AGGR+AATA ST NAA+NON-PRB: NEGATIVE
EC STX1+STX2 + H7 FLIC SPEC NAA+PROBE: NEGATIVE
ENTAMOEBA HISTOLYTICA PCR: NEGATIVE
EOSINOPHIL # BLD AUTO: 0.38 X10(3) UL (ref 0–0.7)
EOSINOPHIL NFR BLD AUTO: 5.5 %
EPEC EAE GENE STL QL NAA+NON-PROBE: NEGATIVE
ERYTHROCYTE [DISTWIDTH] IN BLOOD BY AUTOMATED COUNT: 12.8 %
ETEC LTA+ST1A+ST1B TOX ST NAA+NON-PROBE: NEGATIVE
GFR SERPLBLD BASED ON 1.73 SQ M-ARVRAT: 107 ML/MIN/1.73M2 (ref 60–?)
GIARDIA LAMBLIA PCR: NEGATIVE
GLOBULIN PLAS-MCNC: 3.3 G/DL (ref 2.8–4.4)
GLUCOSE BLD-MCNC: 153 MG/DL (ref 70–99)
HAV IGM SER QL: NONREACTIVE
HBV CORE IGM SER QL: NONREACTIVE
HBV SURFACE AB SER QL: NONREACTIVE
HBV SURFACE AB SERPL IA-ACNC: <3.1 MIU/ML
HBV SURFACE AG SERPL QL IA: NONREACTIVE
HCT VFR BLD AUTO: 43.1 %
HCV AB SERPL QL IA: NONREACTIVE
HGB BLD-MCNC: 14.3 G/DL
IMM GRANULOCYTES # BLD AUTO: 0.05 X10(3) UL (ref 0–1)
IMM GRANULOCYTES NFR BLD: 0.7 %
INR BLD: 1.1 (ref 0.85–1.16)
LYMPHOCYTES # BLD AUTO: 1.42 X10(3) UL (ref 1–4)
LYMPHOCYTES NFR BLD AUTO: 20.7 %
MAGNESIUM SERPL-MCNC: 1.9 MG/DL (ref 1.6–2.6)
MCH RBC QN AUTO: 29.1 PG (ref 26–34)
MCHC RBC AUTO-ENTMCNC: 33.2 G/DL (ref 31–37)
MCV RBC AUTO: 87.6 FL
MONOCYTES # BLD AUTO: 0.54 X10(3) UL (ref 0.1–1)
MONOCYTES NFR BLD AUTO: 7.9 %
NEUTROPHILS # BLD AUTO: 4.41 X10 (3) UL (ref 1.5–7.7)
NEUTROPHILS # BLD AUTO: 4.41 X10(3) UL (ref 1.5–7.7)
NEUTROPHILS NFR BLD AUTO: 64.2 %
NOROVIRUS GI/GII PCR: NEGATIVE
OSMOLALITY SERPL CALC.SUM OF ELEC: 291 MOSM/KG (ref 275–295)
P SHIGELLOIDES DNA STL QL NAA+PROBE: NEGATIVE
PHOSPHATE SERPL-MCNC: 2.9 MG/DL (ref 2.5–4.9)
PLATELET # BLD AUTO: 323 10(3)UL (ref 150–450)
POTASSIUM SERPL-SCNC: 3.8 MMOL/L (ref 3.5–5.1)
PROT SERPL-MCNC: 6.2 G/DL (ref 6.4–8.2)
PROTHROMBIN TIME: 14.2 SECONDS (ref 11.6–14.8)
RBC # BLD AUTO: 4.92 X10(6)UL
ROTAVIRUS A PCR: NEGATIVE
SALMONELLA DNA SPEC QL NAA+PROBE: NEGATIVE
SAPOVIRUS PCR: NEGATIVE
SHIGELLA SP+EIEC IPAH ST NAA+NON-PROBE: NEGATIVE
SODIUM SERPL-SCNC: 139 MMOL/L (ref 136–145)
V CHOLERAE DNA SPEC QL NAA+PROBE: NEGATIVE
VIBRIO DNA SPEC NAA+PROBE: NEGATIVE
WBC # BLD AUTO: 6.9 X10(3) UL (ref 4–11)
YERSINIA DNA SPEC NAA+PROBE: NEGATIVE

## 2023-03-11 PROCEDURE — 99232 SBSQ HOSP IP/OBS MODERATE 35: CPT | Performed by: INTERNAL MEDICINE

## 2023-03-11 RX ORDER — MESALAMINE 400 MG/1
2400 CAPSULE, DELAYED RELEASE ORAL
Status: DISCONTINUED | OUTPATIENT
Start: 2023-03-12 | End: 2023-03-13

## 2023-03-11 RX ORDER — METHYLPREDNISOLONE SODIUM SUCCINATE 40 MG/ML
20 INJECTION, POWDER, LYOPHILIZED, FOR SOLUTION INTRAMUSCULAR; INTRAVENOUS EVERY 8 HOURS
Status: DISCONTINUED | OUTPATIENT
Start: 2023-03-11 | End: 2023-03-13

## 2023-03-11 NOTE — PLAN OF CARE
Pt A&Ox4. Glasses at bedside. Room air, . Tele, NSR. Afebrile. Lovenox. IVF. Voids, up ad sarah. C/o of bloody diarrhea d/t UC flare up. Stool panel pending & cdiff(-). C/o of mild abdominal cramping before passing stool but relieves quickly. L buttocks cellulitis noted, site c/d/i. No c/o of sob, n/v. Low fiber/soft diet, tolerating well. Pt updated on po. No further needs at this time. Safety precautions in place. WCTM.        Problem: Patient/Family Goals  Goal: Patient/Family Long Term Goal  Description: Patient's Long Term Goal: Discharge with adequate resources    Interventions:  - Identify barriers to discharge w/pt and caregiver  - Include patient/family/discharge partner in discharge planning  - Arrange for needed discharge resources and transportation as appropriate  - Identify discharge learning needs   - Consider post-discharge preferences of patient/family/discharge partner  - Assess patient's ability to be responsible for managing their own health  - Refer to Case Management Department for coordinating discharge planning if the patient needs post-hospital services   - See additional Care Plan goals for specific interventions  Outcome: Progressing  Goal: Patient/Family Short Term Goal  Description: Patient's Short Term Goal:   3/10: remain comfortable  3/10 NOC: control diarrhea, sleep well   Interventions:   - medications per [facility]  - frequent rounding   - See additional Care Plan goals for specific interventions  Outcome: Progressing     Problem: PAIN - ADULT  Goal: Verbalizes/displays adequate comfort level or patient's stated pain goal  Description: INTERVENTIONS:  - Encourage pt to monitor pain and request assistance  - Assess pain using appropriate pain scale  - Administer analgesics based on type and severity of pain and evaluate response  - Implement non-pharmacological measures as appropriate and evaluate response  - Consider cultural and social influences on pain and pain management  - Manage/alleviate anxiety  - Utilize distraction and/or relaxation techniques  - Monitor for opioid side effects  - Notify MD/LIP if interventions unsuccessful or patient reports new pain  - Anticipate increased pain with activity and pre-medicate as appropriate  Outcome: Progressing     Problem: RISK FOR INFECTION - ADULT  Goal: Absence of fever/infection during anticipated neutropenic period  Description: INTERVENTIONS  - Monitor WBC  - Administer growth factors as ordered  - Implement neutropenic guidelines  Outcome: Progressing     Problem: SAFETY ADULT - FALL  Goal: Free from fall injury  Description: INTERVENTIONS:  - Assess pt frequently for physical needs  - Identify cognitive and physical deficits and behaviors that affect risk of falls.   - [place] fall precautions as indicated by assessment.  - Educate pt/family on patient safety including physical limitations  - Instruct pt to call for assistance with activity based on assessment  - Modify environment to reduce risk of injury  - Provide assistive devices as appropriate  - Consider OT/PT consult to assist with strengthening/mobility  - Encourage toileting schedule  Outcome: Progressing     Problem: DISCHARGE PLANNING  Goal: Discharge to home or other facility with appropriate resources  Description: INTERVENTIONS:  - Identify barriers to discharge w/pt and caregiver  - Include patient/family/discharge partner in discharge planning  - Arrange for needed discharge resources and transportation as appropriate  - Identify discharge learning needs (meds, wound care, etc)  - Arrange for interpreters to assist at discharge as needed  - Consider post-discharge preferences of patient/family/discharge partner  - Complete POLST form as appropriate  - Assess patient's ability to be responsible for managing their own health  - Refer to Case Management Department for coordinating discharge planning if the patient needs post-hospital services based on physician/LIP order or complex needs related to functional status, cognitive ability or social support system  Outcome: Progressing     Problem: GASTROINTESTINAL - ADULT  Goal: Minimal or absence of nausea and vomiting  Description: INTERVENTIONS:  - Maintain adequate hydration with IV or PO as ordered and tolerated  - Nasogastric tube to low intermittent suction as ordered  - Evaluate effectiveness of ordered antiemetic medications  - Provide nonpharmacologic comfort measures as appropriate  - Advance diet as tolerated, if ordered  - Obtain nutritional consult as needed  - Evaluate fluid balance  Outcome: Progressing  Goal: Maintains or returns to baseline bowel function  Description: INTERVENTIONS:  - Assess bowel function  - Maintain adequate hydration with IV or PO as ordered and tolerated  - Evaluate effectiveness of GI medications  - Encourage mobilization and activity  - Obtain nutritional consult as needed  - Establish a toileting routine/schedule  - Consider collaborating with pharmacy to review patient's medication profile  Outcome: Progressing  Goal: Maintains adequate nutritional intake (undernourished)  Description: INTERVENTIONS:  - Monitor percentage of each meal consumed  - Identify factors contributing to decreased intake, treat as appropriate  - Assist with meals as needed  - Monitor I&O, WT and lab values  - Obtain nutritional consult as needed  - Optimize oral hygiene and moisture  - Encourage food from home; allow for food preferences  - Enhance eating environment  Outcome: Progressing

## 2023-03-11 NOTE — PLAN OF CARE
Pt is A&Ox4, wears glasses. Room air, continuous pulse oxygen, O2 sating WNL. Tele-NSR. No complaints of pain. Pt reports abdominal cramping prior to BMs that resolves quickly. Pt reported 11 loose, red/brown stools since midnight. L buttocks cellulitis noted, site C/D/I. Wife at bedside. Pt updated on POC, all questions and concerns addressed, Safety precautions in place, no further needs at this time.     Problem: Patient/Family Goals  Goal: Patient/Family Long Term Goal  Description: Patient's Long Term Goal: Discharge with adequate resources    Interventions:  - Identify barriers to discharge w/pt and caregiver  - Include patient/family/discharge partner in discharge planning  - Arrange for needed discharge resources and transportation as appropriate  - Identify discharge learning needs   - Consider post-discharge preferences of patient/family/discharge partner  - Assess patient's ability to be responsible for managing their own health  - Refer to Case Management Department for coordinating discharge planning if the patient needs post-hospital services   - See additional Care Plan goals for specific interventions  Outcome: Progressing  Goal: Patient/Family Short Term Goal  Description: Patient's Short Term Goal:   3/10: remain comfortable  3/10 NOC: control diarrhea, sleep well   3/11: Control diarrhea, remain comfortable    Interventions:   - medications per [facility]  - frequent rounding   - See additional Care Plan goals for specific interventions  Outcome: Progressing     Problem: PAIN - ADULT  Goal: Verbalizes/displays adequate comfort level or patient's stated pain goal  Description: INTERVENTIONS:  - Encourage pt to monitor pain and request assistance  - Assess pain using appropriate pain scale  - Administer analgesics based on type and severity of pain and evaluate response  - Implement non-pharmacological measures as appropriate and evaluate response  - Consider cultural and social influences on pain and pain management  - Manage/alleviate anxiety  - Utilize distraction and/or relaxation techniques  - Monitor for opioid side effects  - Notify MD/LIP if interventions unsuccessful or patient reports new pain  - Anticipate increased pain with activity and pre-medicate as appropriate  Outcome: Progressing     Problem: RISK FOR INFECTION - ADULT  Goal: Absence of fever/infection during anticipated neutropenic period  Description: INTERVENTIONS  - Monitor WBC  - Administer growth factors as ordered  - Implement neutropenic guidelines  Outcome: Progressing     Problem: SAFETY ADULT - FALL  Goal: Free from fall injury  Description: INTERVENTIONS:  - Assess pt frequently for physical needs  - Identify cognitive and physical deficits and behaviors that affect risk of falls.   - [place] fall precautions as indicated by assessment.  - Educate pt/family on patient safety including physical limitations  - Instruct pt to call for assistance with activity based on assessment  - Modify environment to reduce risk of injury  - Provide assistive devices as appropriate  - Consider OT/PT consult to assist with strengthening/mobility  - Encourage toileting schedule  Outcome: Progressing     Problem: DISCHARGE PLANNING  Goal: Discharge to home or other facility with appropriate resources  Description: INTERVENTIONS:  - Identify barriers to discharge w/pt and caregiver  - Include patient/family/discharge partner in discharge planning  - Arrange for needed discharge resources and transportation as appropriate  - Identify discharge learning needs (meds, wound care, etc)  - Arrange for interpreters to assist at discharge as needed  - Consider post-discharge preferences of patient/family/discharge partner  - Complete POLST form as appropriate  - Assess patient's ability to be responsible for managing their own health  - Refer to Case Management Department for coordinating discharge planning if the patient needs post-hospital services based on physician/LIP order or complex needs related to functional status, cognitive ability or social support system  Outcome: Progressing     Problem: GASTROINTESTINAL - ADULT  Goal: Minimal or absence of nausea and vomiting  Description: INTERVENTIONS:  - Maintain adequate hydration with IV or PO as ordered and tolerated  - Nasogastric tube to low intermittent suction as ordered  - Evaluate effectiveness of ordered antiemetic medications  - Provide nonpharmacologic comfort measures as appropriate  - Advance diet as tolerated, if ordered  - Obtain nutritional consult as needed  - Evaluate fluid balance  Outcome: Progressing  Goal: Maintains or returns to baseline bowel function  Description: INTERVENTIONS:  - Assess bowel function  - Maintain adequate hydration with IV or PO as ordered and tolerated  - Evaluate effectiveness of GI medications  - Encourage mobilization and activity  - Obtain nutritional consult as needed  - Establish a toileting routine/schedule  - Consider collaborating with pharmacy to review patient's medication profile  Outcome: Progressing  Goal: Maintains adequate nutritional intake (undernourished)  Description: INTERVENTIONS:  - Monitor percentage of each meal consumed  - Identify factors contributing to decreased intake, treat as appropriate  - Assist with meals as needed  - Monitor I&O, WT and lab values  - Obtain nutritional consult as needed  - Optimize oral hygiene and moisture  - Encourage food from home; allow for food preferences  - Enhance eating environment  Outcome: Progressing

## 2023-03-12 LAB
ALBUMIN SERPL-MCNC: 3.1 G/DL (ref 3.4–5)
ALBUMIN/GLOB SERPL: 0.8 {RATIO} (ref 1–2)
ALP LIVER SERPL-CCNC: 57 U/L
ALT SERPL-CCNC: 26 U/L
ANION GAP SERPL CALC-SCNC: 7 MMOL/L (ref 0–18)
AST SERPL-CCNC: 12 U/L (ref 15–37)
BILIRUB SERPL-MCNC: 0.3 MG/DL (ref 0.1–2)
BUN BLD-MCNC: 7 MG/DL (ref 7–18)
CALCIUM BLD-MCNC: 9.4 MG/DL (ref 8.5–10.1)
CHLORIDE SERPL-SCNC: 107 MMOL/L (ref 98–112)
CO2 SERPL-SCNC: 26 MMOL/L (ref 21–32)
CREAT BLD-MCNC: 0.69 MG/DL
ERYTHROCYTE [DISTWIDTH] IN BLOOD BY AUTOMATED COUNT: 12.5 %
GFR SERPLBLD BASED ON 1.73 SQ M-ARVRAT: 108 ML/MIN/1.73M2 (ref 60–?)
GLOBULIN PLAS-MCNC: 3.7 G/DL (ref 2.8–4.4)
GLUCOSE BLD-MCNC: 140 MG/DL (ref 70–99)
HCT VFR BLD AUTO: 43 %
HGB BLD-MCNC: 14.9 G/DL
MCH RBC QN AUTO: 29.3 PG (ref 26–34)
MCHC RBC AUTO-ENTMCNC: 34.7 G/DL (ref 31–37)
MCV RBC AUTO: 84.6 FL
OSMOLALITY SERPL CALC.SUM OF ELEC: 290 MOSM/KG (ref 275–295)
PLATELET # BLD AUTO: 361 10(3)UL (ref 150–450)
POTASSIUM SERPL-SCNC: 4 MMOL/L (ref 3.5–5.1)
PROT SERPL-MCNC: 6.8 G/DL (ref 6.4–8.2)
RBC # BLD AUTO: 5.08 X10(6)UL
SODIUM SERPL-SCNC: 140 MMOL/L (ref 136–145)
WBC # BLD AUTO: 5.9 X10(3) UL (ref 4–11)

## 2023-03-12 PROCEDURE — 99232 SBSQ HOSP IP/OBS MODERATE 35: CPT | Performed by: INTERNAL MEDICINE

## 2023-03-12 NOTE — PLAN OF CARE
Problem:Ulcerative Colitis  Data:Pt. A/O x4. VSS. Denies pain. Pt. Reports decreased diarrhea and no blood this am. Abdomen soft, non-distended. Denies nausea. Tolerating diet. IV Solumedrol given as scheduled. Action:Monitor VS and tele. Assessment done. Meds as scheduled. Teaching:All care explained to pt. Pt. Updated with POC. See education flowsheet for details. Response:WIll continue to monitor.   Problem: Patient/Family Goals  Goal: Patient/Family Long Term Goal  Description: Patient's Long Term Goal: Discharge with adequate resources    Interventions:  - Identify barriers to discharge w/pt and caregiver  - Include patient/family/discharge partner in discharge planning  - Arrange for needed discharge resources and transportation as appropriate  - Identify discharge learning needs   - Consider post-discharge preferences of patient/family/discharge partner  - Assess patient's ability to be responsible for managing their own health  - Refer to Case Management Department for coordinating discharge planning if the patient needs post-hospital services   - See additional Care Plan goals for specific interventions  Outcome: Progressing  Goal: Patient/Family Short Term Goal  Description: Patient's Short Term Goal:   3/10: remain comfortable  3/10 NOC: control diarrhea, sleep well   3/11: Control diarrhea, remain comfortable  3/11 NOC: control diarrhea, sleep   3/12 days:decreased diarrhea  Interventions:   - medications per [facility]  - frequent rounding   - See additional Care Plan goals for specific interventions  Outcome: Progressing

## 2023-03-12 NOTE — PLAN OF CARE
Pt A&Ox4. Glasses at bedside. Room air, . Tele, NSR. Afebrile. Lovenox. IVF. IV steroids. Voids, up ad sarah. C/o of bloody diarrhea d/t UC flare up. C/o of mild abdominal cramping before passing stool but relieves quickly. L buttocks cellulitis noted, site c/d/i. No c/o of sob, n/v. Prn melatonin given per pt request to enhance sleep. Low fiber/soft diet, tolerating well. Pt updated on po. No further needs at this time. Safety precautions in place. WCTM.        Problem: Patient/Family Goals  Goal: Patient/Family Long Term Goal  Description: Patient's Long Term Goal: Discharge with adequate resources    Interventions:  - Identify barriers to discharge w/pt and caregiver  - Include patient/family/discharge partner in discharge planning  - Arrange for needed discharge resources and transportation as appropriate  - Identify discharge learning needs   - Consider post-discharge preferences of patient/family/discharge partner  - Assess patient's ability to be responsible for managing their own health  - Refer to Case Management Department for coordinating discharge planning if the patient needs post-hospital services   - See additional Care Plan goals for specific interventions  Outcome: Progressing  Goal: Patient/Family Short Term Goal  Description: Patient's Short Term Goal:   3/10: remain comfortable  3/10 NOC: control diarrhea, sleep well   3/11: Control diarrhea, remain comfortable  3/11 NOC: control diarrhea, sleep   Interventions:   - medications per [facility]  - frequent rounding   - See additional Care Plan goals for specific interventions  Outcome: Progressing     Problem: PAIN - ADULT  Goal: Verbalizes/displays adequate comfort level or patient's stated pain goal  Description: INTERVENTIONS:  - Encourage pt to monitor pain and request assistance  - Assess pain using appropriate pain scale  - Administer analgesics based on type and severity of pain and evaluate response  - Implement non-pharmacological measures as appropriate and evaluate response  - Consider cultural and social influences on pain and pain management  - Manage/alleviate anxiety  - Utilize distraction and/or relaxation techniques  - Monitor for opioid side effects  - Notify MD/LIP if interventions unsuccessful or patient reports new pain  - Anticipate increased pain with activity and pre-medicate as appropriate  Outcome: Progressing     Problem: RISK FOR INFECTION - ADULT  Goal: Absence of fever/infection during anticipated neutropenic period  Description: INTERVENTIONS  - Monitor WBC  - Administer growth factors as ordered  - Implement neutropenic guidelines  Outcome: Progressing     Problem: SAFETY ADULT - FALL  Goal: Free from fall injury  Description: INTERVENTIONS:  - Assess pt frequently for physical needs  - Identify cognitive and physical deficits and behaviors that affect risk of falls.   - [place] fall precautions as indicated by assessment.  - Educate pt/family on patient safety including physical limitations  - Instruct pt to call for assistance with activity based on assessment  - Modify environment to reduce risk of injury  - Provide assistive devices as appropriate  - Consider OT/PT consult to assist with strengthening/mobility  - Encourage toileting schedule  Outcome: Progressing     Problem: DISCHARGE PLANNING  Goal: Discharge to home or other facility with appropriate resources  Description: INTERVENTIONS:  - Identify barriers to discharge w/pt and caregiver  - Include patient/family/discharge partner in discharge planning  - Arrange for needed discharge resources and transportation as appropriate  - Identify discharge learning needs (meds, wound care, etc)  - Arrange for interpreters to assist at discharge as needed  - Consider post-discharge preferences of patient/family/discharge partner  - Complete POLST form as appropriate  - Assess patient's ability to be responsible for managing their own health  - Refer to Case Management Department for coordinating discharge planning if the patient needs post-hospital services based on physician/LIP order or complex needs related to functional status, cognitive ability or social support system  Outcome: Progressing     Problem: GASTROINTESTINAL - ADULT  Goal: Minimal or absence of nausea and vomiting  Description: INTERVENTIONS:  - Maintain adequate hydration with IV or PO as ordered and tolerated  - Nasogastric tube to low intermittent suction as ordered  - Evaluate effectiveness of ordered antiemetic medications  - Provide nonpharmacologic comfort measures as appropriate  - Advance diet as tolerated, if ordered  - Obtain nutritional consult as needed  - Evaluate fluid balance  Outcome: Progressing  Goal: Maintains or returns to baseline bowel function  Description: INTERVENTIONS:  - Assess bowel function  - Maintain adequate hydration with IV or PO as ordered and tolerated  - Evaluate effectiveness of GI medications  - Encourage mobilization and activity  - Obtain nutritional consult as needed  - Establish a toileting routine/schedule  - Consider collaborating with pharmacy to review patient's medication profile  Outcome: Progressing  Goal: Maintains adequate nutritional intake (undernourished)  Description: INTERVENTIONS:  - Monitor percentage of each meal consumed  - Identify factors contributing to decreased intake, treat as appropriate  - Assist with meals as needed  - Monitor I&O, WT and lab values  - Obtain nutritional consult as needed  - Optimize oral hygiene and moisture  - Encourage food from home; allow for food preferences  - Enhance eating environment  Outcome: Progressing

## 2023-03-13 VITALS
WEIGHT: 166 LBS | RESPIRATION RATE: 18 BRPM | HEART RATE: 74 BPM | OXYGEN SATURATION: 99 % | TEMPERATURE: 98 F | BODY MASS INDEX: 25 KG/M2 | DIASTOLIC BLOOD PRESSURE: 77 MMHG | SYSTOLIC BLOOD PRESSURE: 122 MMHG

## 2023-03-13 PROCEDURE — 99239 HOSP IP/OBS DSCHRG MGMT >30: CPT | Performed by: INTERNAL MEDICINE

## 2023-03-13 RX ORDER — PREDNISONE 10 MG/1
TABLET ORAL
Qty: 102 TABLET | Refills: 0 | Status: SHIPPED | OUTPATIENT
Start: 2023-03-13 | End: 2023-04-24

## 2023-03-13 RX ORDER — PREDNISONE 10 MG/1
40 TABLET ORAL DAILY
Qty: 56 TABLET | Refills: 0 | Status: SHIPPED | OUTPATIENT
Start: 2023-03-13 | End: 2023-03-13

## 2023-03-13 NOTE — PLAN OF CARE
3/13 a/ox4, denies any pain nor sob at this time. Up ad sarah. SR on tele. Claimed diarrhea is better, hoping he will be home today.     Problem: Patient/Family Goals  Goal: Patient/Family Long Term Goal  Description: Patient's Long Term Goal: Discharge with adequate resources    Interventions:  - Identify barriers to discharge w/pt and caregiver  - Include patient/family/discharge partner in discharge planning  - Arrange for needed discharge resources and transportation as appropriate  - Identify discharge learning needs   - Consider post-discharge preferences of patient/family/discharge partner  - Assess patient's ability to be responsible for managing their own health  - Refer to Case Management Department for coordinating discharge planning if the patient needs post-hospital services   - See additional Care Plan goals for specific interventions  Outcome: Progressing  Goal: Patient/Family Short Term Goal  Description: Patient's Short Term Goal:   3/10: remain comfortable  3/10 NOC: control diarrhea, sleep well   3/11: Control diarrhea, remain comfortable  3/11 NOC: control diarrhea, sleep   3/12 days:decreased diarrhea  3/12 NOC: decrease diarrhea episodes  Interventions:   - medications per MAR  - frequent rounding   - See additional Care Plan goals for specific interventions  Outcome: Progressing     Problem: PAIN - ADULT  Goal: Verbalizes/displays adequate comfort level or patient's stated pain goal  Description: INTERVENTIONS:  - Encourage pt to monitor pain and request assistance  - Assess pain using appropriate pain scale  - Administer analgesics based on type and severity of pain and evaluate response  - Implement non-pharmacological measures as appropriate and evaluate response  - Consider cultural and social influences on pain and pain management  - Manage/alleviate anxiety  - Utilize distraction and/or relaxation techniques  - Monitor for opioid side effects  - Notify MD/LIP if interventions unsuccessful or patient reports new pain  - Anticipate increased pain with activity and pre-medicate as appropriate  Outcome: Progressing     Problem: RISK FOR INFECTION - ADULT  Goal: Absence of fever/infection during anticipated neutropenic period  Description: INTERVENTIONS  - Monitor WBC  - Administer growth factors as ordered  - Implement neutropenic guidelines  Outcome: Progressing     Problem: SAFETY ADULT - FALL  Goal: Free from fall injury  Description: INTERVENTIONS:  - Assess pt frequently for physical needs  - Identify cognitive and physical deficits and behaviors that affect risk of falls.   - [place] fall precautions as indicated by assessment.  - Educate pt/family on patient safety including physical limitations  - Instruct pt to call for assistance with activity based on assessment  - Modify environment to reduce risk of injury  - Provide assistive devices as appropriate  - Consider OT/PT consult to assist with strengthening/mobility  - Encourage toileting schedule  Outcome: Progressing     Problem: DISCHARGE PLANNING  Goal: Discharge to home or other facility with appropriate resources  Description: INTERVENTIONS:  - Identify barriers to discharge w/pt and caregiver  - Include patient/family/discharge partner in discharge planning  - Arrange for needed discharge resources and transportation as appropriate  - Identify discharge learning needs (meds, wound care, etc)  - Arrange for interpreters to assist at discharge as needed  - Consider post-discharge preferences of patient/family/discharge partner  - Complete POLST form as appropriate  - Assess patient's ability to be responsible for managing their own health  - Refer to Case Management Department for coordinating discharge planning if the patient needs post-hospital services based on physician/LIP order or complex needs related to functional status, cognitive ability or social support system  Outcome: Progressing     Problem: GASTROINTESTINAL - ADULT  Goal: Minimal or absence of nausea and vomiting  Description: INTERVENTIONS:  - Maintain adequate hydration with IV or PO as ordered and tolerated  - Nasogastric tube to low intermittent suction as ordered  - Evaluate effectiveness of ordered antiemetic medications  - Provide nonpharmacologic comfort measures as appropriate  - Advance diet as tolerated, if ordered  - Obtain nutritional consult as needed  - Evaluate fluid balance  Outcome: Progressing  Goal: Maintains or returns to baseline bowel function  Description: INTERVENTIONS:  - Assess bowel function  - Maintain adequate hydration with IV or PO as ordered and tolerated  - Evaluate effectiveness of GI medications  - Encourage mobilization and activity  - Obtain nutritional consult as needed  - Establish a toileting routine/schedule  - Consider collaborating with pharmacy to review patient's medication profile  Outcome: Progressing  Goal: Maintains adequate nutritional intake (undernourished)  Description: INTERVENTIONS:  - Monitor percentage of each meal consumed  - Identify factors contributing to decreased intake, treat as appropriate  - Assist with meals as needed  - Monitor I&O, WT and lab values  - Obtain nutritional consult as needed  - Optimize oral hygiene and moisture  - Encourage food from home; allow for food preferences  - Enhance eating environment  Outcome: Progressing

## 2023-03-13 NOTE — PLAN OF CARE
Pt A&Ox4. Glasses at bedside. Room air, . Tele, NSR. Afebrile. Lovenox. IV steroids. Voids, up ad sarah. Bowel movements improving per pt report. No c/o of sob, n/v. Low fiber/soft diet, tolerating well. Pt updated on po. No further needs at this time. Safety precautions in place. WCTM.       Problem: Patient/Family Goals  Goal: Patient/Family Long Term Goal  Description: Patient's Long Term Goal: Discharge with adequate resources    Interventions:  - Identify barriers to discharge w/pt and caregiver  - Include patient/family/discharge partner in discharge planning  - Arrange for needed discharge resources and transportation as appropriate  - Identify discharge learning needs   - Consider post-discharge preferences of patient/family/discharge partner  - Assess patient's ability to be responsible for managing their own health  - Refer to Case Management Department for coordinating discharge planning if the patient needs post-hospital services   - See additional Care Plan goals for specific interventions  Outcome: Progressing  Goal: Patient/Family Short Term Goal  Description: Patient's Short Term Goal:   3/10: remain comfortable  3/10 NOC: control diarrhea, sleep well   3/11: Control diarrhea, remain comfortable  3/11 NOC: control diarrhea, sleep   3/12 days:decreased diarrhea  3/12 NOC: decrease diarrhea episodes  Interventions:   - medications per MAR  - frequent rounding   - See additional Care Plan goals for specific interventions  Outcome: Progressing     Problem: PAIN - ADULT  Goal: Verbalizes/displays adequate comfort level or patient's stated pain goal  Description: INTERVENTIONS:  - Encourage pt to monitor pain and request assistance  - Assess pain using appropriate pain scale  - Administer analgesics based on type and severity of pain and evaluate response  - Implement non-pharmacological measures as appropriate and evaluate response  - Consider cultural and social influences on pain and pain management  - Manage/alleviate anxiety  - Utilize distraction and/or relaxation techniques  - Monitor for opioid side effects  - Notify MD/LIP if interventions unsuccessful or patient reports new pain  - Anticipate increased pain with activity and pre-medicate as appropriate  Outcome: Progressing     Problem: RISK FOR INFECTION - ADULT  Goal: Absence of fever/infection during anticipated neutropenic period  Description: INTERVENTIONS  - Monitor WBC  - Administer growth factors as ordered  - Implement neutropenic guidelines  Outcome: Progressing     Problem: SAFETY ADULT - FALL  Goal: Free from fall injury  Description: INTERVENTIONS:  - Assess pt frequently for physical needs  - Identify cognitive and physical deficits and behaviors that affect risk of falls.   - [place] fall precautions as indicated by assessment.  - Educate pt/family on patient safety including physical limitations  - Instruct pt to call for assistance with activity based on assessment  - Modify environment to reduce risk of injury  - Provide assistive devices as appropriate  - Consider OT/PT consult to assist with strengthening/mobility  - Encourage toileting schedule  Outcome: Progressing     Problem: DISCHARGE PLANNING  Goal: Discharge to home or other facility with appropriate resources  Description: INTERVENTIONS:  - Identify barriers to discharge w/pt and caregiver  - Include patient/family/discharge partner in discharge planning  - Arrange for needed discharge resources and transportation as appropriate  - Identify discharge learning needs (meds, wound care, etc)  - Arrange for interpreters to assist at discharge as needed  - Consider post-discharge preferences of patient/family/discharge partner  - Complete POLST form as appropriate  - Assess patient's ability to be responsible for managing their own health  - Refer to Case Management Department for coordinating discharge planning if the patient needs post-hospital services based on physician/LIP order or complex needs related to functional status, cognitive ability or social support system  Outcome: Progressing     Problem: GASTROINTESTINAL - ADULT  Goal: Minimal or absence of nausea and vomiting  Description: INTERVENTIONS:  - Maintain adequate hydration with IV or PO as ordered and tolerated  - Nasogastric tube to low intermittent suction as ordered  - Evaluate effectiveness of ordered antiemetic medications  - Provide nonpharmacologic comfort measures as appropriate  - Advance diet as tolerated, if ordered  - Obtain nutritional consult as needed  - Evaluate fluid balance  Outcome: Progressing  Goal: Maintains or returns to baseline bowel function  Description: INTERVENTIONS:  - Assess bowel function  - Maintain adequate hydration with IV or PO as ordered and tolerated  - Evaluate effectiveness of GI medications  - Encourage mobilization and activity  - Obtain nutritional consult as needed  - Establish a toileting routine/schedule  - Consider collaborating with pharmacy to review patient's medication profile  Outcome: Progressing  Goal: Maintains adequate nutritional intake (undernourished)  Description: INTERVENTIONS:  - Monitor percentage of each meal consumed  - Identify factors contributing to decreased intake, treat as appropriate  - Assist with meals as needed  - Monitor I&O, WT and lab values  - Obtain nutritional consult as needed  - Optimize oral hygiene and moisture  - Encourage food from home; allow for food preferences  - Enhance eating environment  Outcome: Progressing

## 2023-03-14 ENCOUNTER — TELEPHONE (OUTPATIENT)
Dept: INTERNAL MEDICINE CLINIC | Facility: CLINIC | Age: 58
End: 2023-03-14

## 2023-03-14 ENCOUNTER — PATIENT OUTREACH (OUTPATIENT)
Dept: CASE MANAGEMENT | Age: 58
End: 2023-03-14

## 2023-03-14 DIAGNOSIS — Z02.9 ENCOUNTERS FOR ADMINISTRATIVE PURPOSE: ICD-10-CM

## 2023-03-14 LAB
M TB IFN-G CD4+ T-CELLS BLD-ACNC: 0.03 IU/ML
M TB TUBERC IFN-G BLD QL: NEGATIVE
M TB TUBERC IGNF/MITOGEN IGNF CONTROL: 2.83 IU/ML
QFT TB1 AG MINUS NIL: 0.05 IU/ML
QFT TB2 AG MINUS NIL: 0.05 IU/ML

## 2023-03-14 NOTE — TELEPHONE ENCOUNTER
HARRISON, Spoke to pt for TCM today. Pt declined to schedule HFU at this time stating that he is following up with surgeon and GI first.  TCM/HFU appt recommended by 3/27/2023 as pt is a moderate risk for readmission.      BOOK BY DATE (last date for TCM): 3/27/2023

## 2023-03-16 ENCOUNTER — OFFICE VISIT (OUTPATIENT)
Facility: LOCATION | Age: 58
End: 2023-03-16
Payer: COMMERCIAL

## 2023-03-16 VITALS — HEART RATE: 65 BPM | TEMPERATURE: 97 F

## 2023-03-16 DIAGNOSIS — K61.0 PERIANAL ABSCESS: Primary | ICD-10-CM

## 2023-03-16 LAB — THIOPURINE METHYLTRANSFERASE: 30.5 U/ML

## 2023-03-16 PROCEDURE — 99203 OFFICE O/P NEW LOW 30 MIN: CPT | Performed by: STUDENT IN AN ORGANIZED HEALTH CARE EDUCATION/TRAINING PROGRAM

## 2023-03-16 PROCEDURE — 46600 DIAGNOSTIC ANOSCOPY SPX: CPT | Performed by: STUDENT IN AN ORGANIZED HEALTH CARE EDUCATION/TRAINING PROGRAM

## 2023-03-17 LAB — CALPROTECTIN STL-MCNT: >800 ΜG/G (ref ?–50)

## 2023-04-24 ENCOUNTER — TELEPHONE (OUTPATIENT)
Facility: LOCATION | Age: 58
End: 2023-04-24

## 2023-04-24 NOTE — TELEPHONE ENCOUNTER
Pt states last night on right buttock lesion was bleeding more than nose bleed. Today is not bleeding much. Pt scheduled on [facility] an appt 5/1. Pt is wondering if he can be seen sooner?     Please advise  Call back [phone]

## 2023-04-24 NOTE — TELEPHONE ENCOUNTER
S/w pt, he states yesterday he had bloody drainage that has slowed down and turned into scant amount of pink drainage. Denies pain, fever, redness, swelling.   Pt verbalized understanding for appt 5/1 and will call if any s/s worsen prior

## 2023-06-19 ENCOUNTER — OFFICE VISIT (OUTPATIENT)
Facility: LOCATION | Age: 58
End: 2023-06-19
Payer: COMMERCIAL

## 2023-06-19 VITALS — TEMPERATURE: 99 F

## 2023-06-19 DIAGNOSIS — K61.0 PERIANAL ABSCESS: Primary | ICD-10-CM

## 2023-06-19 PROCEDURE — 99213 OFFICE O/P EST LOW 20 MIN: CPT | Performed by: STUDENT IN AN ORGANIZED HEALTH CARE EDUCATION/TRAINING PROGRAM

## 2023-08-22 ENCOUNTER — PATIENT MESSAGE (OUTPATIENT)
Dept: INTERNAL MEDICINE CLINIC | Facility: CLINIC | Age: 58
End: 2023-08-22

## 2023-08-22 DIAGNOSIS — K64.8 INTERNAL HEMORRHOIDS: ICD-10-CM

## 2023-08-22 DIAGNOSIS — K61.0 PERIANAL ABSCESS: ICD-10-CM

## 2023-08-22 DIAGNOSIS — K62.9 RECTAL LESION: Primary | ICD-10-CM

## 2023-08-22 DIAGNOSIS — R19.4 CHANGE IN BOWEL HABITS: ICD-10-CM

## 2023-08-22 DIAGNOSIS — K64.9 HEMORRHOIDS, UNSPECIFIED HEMORRHOID TYPE: ICD-10-CM

## 2023-08-22 NOTE — TELEPHONE ENCOUNTER
From: Jarad Ware  To: Karina Cameron MD  Sent: 8/22/2023 4:54 PM CDT  Subject: Referral    Hello Dr. Emmanuel Riley,  I am out of referrals for my gastroenterologist, Dr. Keegan Juárez. Can you please refer me for 6 more visits? Thank you very much.    Farhat Daniles

## 2023-12-06 ENCOUNTER — HOSPITAL ENCOUNTER (EMERGENCY)
Facility: HOSPITAL | Age: 58
Discharge: HOME | End: 2023-12-06
Attending: EMERGENCY MEDICINE
Payer: COMMERCIAL

## 2023-12-06 VITALS
DIASTOLIC BLOOD PRESSURE: 76 MMHG | WEIGHT: 170 LBS | OXYGEN SATURATION: 98 % | TEMPERATURE: 99 F | HEIGHT: 69 IN | BODY MASS INDEX: 25.18 KG/M2 | RESPIRATION RATE: 16 BRPM | SYSTOLIC BLOOD PRESSURE: 136 MMHG | HEART RATE: 61 BPM

## 2023-12-06 DIAGNOSIS — T50.905A ADVERSE EFFECT OF DRUG, INITIAL ENCOUNTER: Primary | ICD-10-CM

## 2023-12-06 LAB
ALBUMIN SERPL-MCNC: 3.9 G/DL (ref 3.4–5)
ALBUMIN/GLOB SERPL: 1.3 {RATIO} (ref 1–2)
ALP LIVER SERPL-CCNC: 56 U/L
ALT SERPL-CCNC: 39 U/L
ANION GAP SERPL CALC-SCNC: 6 MMOL/L (ref 0–18)
AST SERPL-CCNC: 24 U/L (ref 15–37)
ATRIAL RATE: 54 BPM
BASOPHILS # BLD AUTO: 0.07 X10(3) UL (ref 0–0.2)
BASOPHILS NFR BLD AUTO: 0.9 %
BILIRUB SERPL-MCNC: 1.2 MG/DL (ref 0.1–2)
BUN BLD-MCNC: 15 MG/DL (ref 9–23)
CALCIUM BLD-MCNC: 9.5 MG/DL (ref 8.5–10.1)
CHLORIDE SERPL-SCNC: 106 MMOL/L (ref 98–112)
CO2 SERPL-SCNC: 27 MMOL/L (ref 21–32)
CREAT BLD-MCNC: 0.78 MG/DL
EGFRCR SERPLBLD CKD-EPI 2021: 103 ML/MIN/1.73M2 (ref 60–?)
EOSINOPHIL # BLD AUTO: 0.27 X10(3) UL (ref 0–0.7)
EOSINOPHIL NFR BLD AUTO: 3.7 %
ERYTHROCYTE [DISTWIDTH] IN BLOOD BY AUTOMATED COUNT: 13 %
GLOBULIN PLAS-MCNC: 3.1 G/DL (ref 2.8–4.4)
GLUCOSE BLD-MCNC: 85 MG/DL (ref 70–99)
GLUCOSE BLD-MCNC: 93 MG/DL (ref 70–99)
HCT VFR BLD AUTO: 43.9 %
HGB BLD-MCNC: 14.9 G/DL
IMM GRANULOCYTES # BLD AUTO: 0.02 X10(3) UL (ref 0–1)
IMM GRANULOCYTES NFR BLD: 0.3 %
LYMPHOCYTES # BLD AUTO: 1.85 X10(3) UL (ref 1–4)
LYMPHOCYTES NFR BLD AUTO: 25.1 %
MCH RBC QN AUTO: 29.2 PG (ref 26–34)
MCHC RBC AUTO-ENTMCNC: 33.9 G/DL (ref 31–37)
MCV RBC AUTO: 86.1 FL
MONOCYTES # BLD AUTO: 0.54 X10(3) UL (ref 0.1–1)
MONOCYTES NFR BLD AUTO: 7.3 %
NEUTROPHILS # BLD AUTO: 4.63 X10 (3) UL (ref 1.5–7.7)
NEUTROPHILS # BLD AUTO: 4.63 X10(3) UL (ref 1.5–7.7)
NEUTROPHILS NFR BLD AUTO: 62.7 %
OSMOLALITY SERPL CALC.SUM OF ELEC: 288 MOSM/KG (ref 275–295)
P AXIS: 65 DEGREES
P-R INTERVAL: 160 MS
PLATELET # BLD AUTO: 252 10(3)UL (ref 150–450)
POTASSIUM SERPL-SCNC: 4 MMOL/L (ref 3.5–5.1)
PROT SERPL-MCNC: 7 G/DL (ref 6.4–8.2)
Q-T INTERVAL: 408 MS
QRS DURATION: 88 MS
QTC CALCULATION (BEZET): 386 MS
R AXIS: 26 DEGREES
RBC # BLD AUTO: 5.1 X10(6)UL
SODIUM SERPL-SCNC: 139 MMOL/L (ref 136–145)
T AXIS: 68 DEGREES
VENTRICULAR RATE: 54 BPM
WBC # BLD AUTO: 7.4 X10(3) UL (ref 4–11)

## 2023-12-06 PROCEDURE — 99285 EMERGENCY DEPT VISIT HI MDM: CPT

## 2023-12-06 PROCEDURE — 82962 GLUCOSE BLOOD TEST: CPT

## 2023-12-06 PROCEDURE — 85025 COMPLETE CBC W/AUTO DIFF WBC: CPT | Performed by: EMERGENCY MEDICINE

## 2023-12-06 PROCEDURE — 80053 COMPREHEN METABOLIC PANEL: CPT | Performed by: EMERGENCY MEDICINE

## 2023-12-06 PROCEDURE — 93005 ELECTROCARDIOGRAM TRACING: CPT

## 2023-12-06 PROCEDURE — 99284 EMERGENCY DEPT VISIT MOD MDM: CPT

## 2023-12-06 PROCEDURE — 93010 ELECTROCARDIOGRAM REPORT: CPT

## 2023-12-06 PROCEDURE — 96360 HYDRATION IV INFUSION INIT: CPT

## 2023-12-06 RX ORDER — SODIUM CHLORIDE 9 MG/ML
1000 INJECTION, SOLUTION INTRAVENOUS ONCE
Status: COMPLETED | OUTPATIENT
Start: 2023-12-06 | End: 2023-12-06

## 2023-12-06 NOTE — DISCHARGE INSTRUCTIONS
Possible reaction to medications. Return of headache, chest pain, shortness of breath or new complaints. Follow-up with your doctor.   Follow-up with GI.

## 2023-12-06 NOTE — ED QUICK NOTES
Rounding Completed    Plan of Care reviewed. Waiting for lab results. Elimination needs assessed. Provided comfort measures. Bed is locked and in lowest position. Call light within reach.

## 2023-12-06 NOTE — ED INITIAL ASSESSMENT (HPI)
Prior to a procedure, pt had siezure like activity after 40mg 1% lido and 120mg of Propafol were given pre procedure, pt started to have seizue like activity, 2mg of versed wake up, pt up but still shaking     Patient states he remembers being put under and when he woke up he thought the procedure was done. Patient without any complaints at this time.

## 2024-01-16 ENCOUNTER — HOSPITAL ENCOUNTER (OUTPATIENT)
Dept: BONE DENSITY | Age: 59
Discharge: HOME | End: 2024-01-16
Attending: INTERNAL MEDICINE
Payer: COMMERCIAL

## 2024-01-16 DIAGNOSIS — K51.211 ULCERATIVE PROCTITIS WITH RECTAL BLEEDING (HCC): ICD-10-CM

## 2024-01-16 PROCEDURE — 77080 DXA BONE DENSITY AXIAL: CPT | Performed by: INTERNAL MEDICINE

## 2024-02-08 PROBLEM — K51.20 ULCERATIVE (CHRONIC) PROCTITIS (HCC): Status: ACTIVE | Noted: 2024-02-08

## 2024-02-12 ENCOUNTER — OFFICE VISIT (OUTPATIENT)
Dept: INTERNAL MEDICINE CLINIC | Facility: CLINIC | Age: 59
End: 2024-02-12
Payer: COMMERCIAL

## 2024-02-12 VITALS
BODY MASS INDEX: 26.48 KG/M2 | SYSTOLIC BLOOD PRESSURE: 129 MMHG | HEIGHT: 69 IN | RESPIRATION RATE: 12 BRPM | DIASTOLIC BLOOD PRESSURE: 82 MMHG | WEIGHT: 178.81 LBS | OXYGEN SATURATION: 98 % | HEART RATE: 67 BPM | TEMPERATURE: 98 F

## 2024-02-12 DIAGNOSIS — Z00.00 ENCOUNTER FOR PREVENTATIVE ADULT HEALTH CARE EXAMINATION: Primary | ICD-10-CM

## 2024-02-12 DIAGNOSIS — Z12.5 SCREENING FOR MALIGNANT NEOPLASM OF PROSTATE: ICD-10-CM

## 2024-02-12 DIAGNOSIS — M25.562 BILATERAL CHRONIC KNEE PAIN: ICD-10-CM

## 2024-02-12 DIAGNOSIS — G89.29 BILATERAL CHRONIC KNEE PAIN: ICD-10-CM

## 2024-02-12 DIAGNOSIS — E78.00 HYPERCHOLESTEROLEMIA: Chronic | ICD-10-CM

## 2024-02-12 DIAGNOSIS — M25.561 BILATERAL CHRONIC KNEE PAIN: ICD-10-CM

## 2024-02-12 DIAGNOSIS — M85.89 OSTEOPENIA OF MULTIPLE SITES: ICD-10-CM

## 2024-02-12 DIAGNOSIS — K51.20 CHRONIC ULCERATIVE PROCTITIS WITHOUT COMPLICATIONS (HCC): Chronic | ICD-10-CM

## 2024-02-12 PROCEDURE — 99396 PREV VISIT EST AGE 40-64: CPT | Performed by: INTERNAL MEDICINE

## 2024-02-12 NOTE — PATIENT INSTRUCTIONS
- Get blood tests done at [facility] when fasting (water and medications only for 8 hours).  - Start daily over the counter calcium supplement (400 mg daily).  Continue with vitamin D, at least 1000 units daily.  - Will check bone density scans every 2 years to monitor  - Schedule x-rays of knees through [facility] or call central scheduling at [phone].  Based on results we may have you see our Orthopedics specialists.  - Follow up in 1 year for physical/chronic issues; follow up earlier as needed.    It was a pleasure seeing you in the clinic today.  Thank you for choosing the [facility] office for your healthcare needs. Please call at [phone] with any questions or concerns.    Shruthi Downey MD

## 2024-02-12 NOTE — PROGRESS NOTES
Ameya Kelly is a 58 year old male.   HPI:   Patient presents for CPX/wellness examination.  Diet: Generally healthy  Exercise: Getting some more exercise with UC getting better  Vision: Wears glasses - due for eye exam; insurance only covers glasses every 2 years.  Dental: Up to date - gets cleanings every 3-4 months.    Acute issues:    Chronic issues:  Since last visit he was diagnosed with ulcerative colitis.  On Entivyo - symptoms significantly improved.  He did have a reaction during anesthesia for his most recent scope - possibly from lidocaine.    Did have a DEXA scan - showed osteopenia.    Chronic bilateral knee pain.  Left > right.  Had arthroscopic surgery on both knees 20+ years ago.    Past medical, family, surgical and social history were reviewed and updated in chart.  REVIEW OF SYSTEMS:   GENERAL/ const: no fevers/chills, no unintentional weight loss  SKIN: denies any unusual skin lesions  EYES:no vision problems  HEENT: denies sinus pain or sinus tenderness  LUNGS: denies shortness of breath   CARDIOVASCULAR: denies chest pain  GI: denies nausea/emesis/ abdominal pain diarrhea constipation  : denies dysuria   MUSCULOSKELETAL: chronic bilateral knee pain  NEURO: denies headaches  PSYCHIATRIC: denies issues  ENDOCRINE: no hot/cold intolerance  ALLERGY: No Known Allergies  PAST HISTORY:     Current Outpatient Medications:     Vedolizumab (ENTYVIO IV), Inject into the vein Every 8 (eight) weeks., Disp: , Rfl:     Ferrous Sulfate 325 (65 Fe) MG Oral Tab, Take 1 tablet (325 mg total) by mouth daily with breakfast., Disp: 90 tablet, Rfl: 0  Medical:  has a past medical history of Cellulitis (02/24/2023), Internal hemorrhoids, Perianal abscess, Uncomfortable fullness after meals, Visual impairment, and Wears glasses.  Surgical:  has a past surgical history that includes other surgical history (01/01/2000); colonoscopy (02/2023); and colonoscopy.  Family: family history includes Cancer in his mother;  Healthy in his brother and brother; Heart Disorder in his father; Hep C in his brother.  Social:  reports that he has quit smoking. His smoking use included cigarettes. He smoked an average of 1 pack per day. He has never used smokeless tobacco. He reports current alcohol use of about 6.0 standard drinks of alcohol per week. He reports that he does not use drugs.  Wt Readings from Last 6 Encounters:   02/12/24 178 lb 12.8 oz (81.1 kg)   02/08/24 173 lb (78.5 kg)   12/06/23 170 lb (77.1 kg)   11/14/23 180 lb (81.6 kg)   11/14/23 180 lb (81.6 kg)   08/08/23 171 lb (77.6 kg)     EXAM:   /82 (BP Location: Left arm, Patient Position: Sitting, Cuff Size: adult)   Pulse 67   Temp 98.1 °F (36.7 °C) (Temporal)   Resp 12   Ht 5' 9\" (1.753 m)   Wt 178 lb 12.8 oz (81.1 kg)   SpO2 98%   BMI 26.40 kg/m²   GENERAL: Alert and oriented, well developed, well nourished,in no apparent distress  SKIN: no rashes,no suspicious lesions  HEENT: atraumatic, PERRLA, EOMI, normal lid and conjunctiva, normal external canals and tympanic membranes bilaterally  NECK: supple, no jvd, no thyromegaly, no palpable/tender cervical lymphadenopathy  LUNGS: clear to auscultation bilaterally, no wheezing/rubs  CARDIO: RRR without murmurs.  No clubbing, cyanosis or edema.  GI: soft non tender nondistended no hepatosplenomegaly, bowel sounds throughout  NEURO: CN II-XII intact, 5/5 strength all extremities  MS: Full ROM  PSYCH: pleasant, appropriate mood and affect  ASSESSMENT AND PLAN:   1.  Encounter for preventative adult health examination  2. Screening for malignant neoplasm of prostate  Age appropriate health guidance and counseling provided.  Screening labs ordered as below.  Body mass index is 26.4 kg/m².  - CBC With Differential With Platelet; Future  - Comp Metabolic Panel (14); Future  - Hemoglobin A1C; Future  - Lipid Panel; Future  - PSA - Total [5363][Q]; Future    3. Osteopenia of multiple sites  Noted on DEXA scan.  Likely  related to UC.  Already on OTC vitamin D; advised him to start OTC calcium as well.  Repeat DEXA in 2 years.  - Vitamin D, 25-Hydroxy; Future    4. Hypercholesterolemia  Lifestyle controlled.  Lipid panel ordered with screening labs.    5. Chronic ulcerative proctitis without complications (HCC)  New diagnosis since last office visit.  Now on Entivyo.  Doing much better.    6. Bilateral chronic knee pain  Likely osteoarthritis.  Bilateral knee pain.  Had knee arthroscopies 20+ years ago.  Will check x-rays, likely refer to Orthopedics based on results.  - XR KNEE (3 VIEWS), RIGHT (CPT=73562); Future  - XR KNEE (3 VIEWS), LEFT (CPT=73562); Future    Patient Care Team:  Shruthi Downey MD as PCP - General (Internal Medicine)  Delbert Valencia MD (GASTROENTEROLOGY)  The patient indicates understanding of these issues and agrees to the plan.  The patient is asked to return to clinic in 12 months for follow up on chronic issues/CPX, or earlier if acute issues arise.    Shruthi Downey MD

## 2024-02-13 PROBLEM — K61.0 PERIANAL ABSCESS: Status: RESOLVED | Noted: 2023-03-16 | Resolved: 2024-02-13

## 2024-02-13 PROBLEM — G89.29 BILATERAL CHRONIC KNEE PAIN: Status: ACTIVE | Noted: 2024-02-13

## 2024-02-13 PROBLEM — M85.89 OSTEOPENIA OF MULTIPLE SITES: Status: ACTIVE | Noted: 2024-02-13

## 2024-02-13 PROBLEM — M85.89 OSTEOPENIA OF MULTIPLE SITES: Chronic | Status: ACTIVE | Noted: 2024-02-13

## 2024-02-13 PROBLEM — K51.20 ULCERATIVE (CHRONIC) PROCTITIS (HCC): Chronic | Status: ACTIVE | Noted: 2024-02-08

## 2024-02-13 PROBLEM — K64.8 INTERNAL HEMORRHOIDS: Status: RESOLVED | Noted: 2023-02-16 | Resolved: 2024-02-13

## 2024-02-13 PROBLEM — M25.561 BILATERAL CHRONIC KNEE PAIN: Status: ACTIVE | Noted: 2024-02-13

## 2024-02-13 PROBLEM — M25.562 BILATERAL CHRONIC KNEE PAIN: Status: ACTIVE | Noted: 2024-02-13

## 2024-02-15 LAB
ABSOLUTE BASOPHILS: 67 CELLS/UL (ref 0–200)
ABSOLUTE EOSINOPHILS: 318 CELLS/UL (ref 15–500)
ABSOLUTE LYMPHOCYTES: 2523 CELLS/UL (ref 850–3900)
ABSOLUTE MONOCYTES: 592 CELLS/UL (ref 200–950)
ABSOLUTE NEUTROPHILS: 3900 CELLS/UL (ref 1500–7800)
ALBUMIN/GLOBULIN RATIO: 1.7 (CALC) (ref 1–2.5)
ALBUMIN: 4.5 G/DL (ref 3.6–5.1)
ALKALINE PHOSPHATASE: 63 U/L (ref 35–144)
ALT: 23 U/L (ref 9–46)
AST: 21 U/L (ref 10–35)
BASOPHILS: 0.9 %
BILIRUBIN, TOTAL: 1.1 MG/DL (ref 0.2–1.2)
BUN: 19 MG/DL (ref 7–25)
CALCIUM: 9.7 MG/DL (ref 8.6–10.3)
CARBON DIOXIDE: 29 MMOL/L (ref 20–32)
CHLORIDE: 105 MMOL/L (ref 98–110)
CHOL/HDLC RATIO: 2.7 (CALC)
CHOLESTEROL, TOTAL: 219 MG/DL
CREATININE: 0.77 MG/DL (ref 0.7–1.3)
EGFR: 104 ML/MIN/1.73M2
EOSINOPHILS: 4.3 %
GLOBULIN: 2.6 G/DL (CALC) (ref 1.9–3.7)
GLUCOSE: 78 MG/DL (ref 65–99)
HDL CHOLESTEROL: 82 MG/DL
HEMATOCRIT: 42.3 % (ref 38.5–50)
HEMOGLOBIN A1C: 4.9 % OF TOTAL HGB
HEMOGLOBIN: 14.4 G/DL (ref 13.2–17.1)
LDL-CHOLESTEROL: 123 MG/DL (CALC)
LYMPHOCYTES: 34.1 %
MCH: 29.8 PG (ref 27–33)
MCHC: 34 G/DL (ref 32–36)
MCV: 87.4 FL (ref 80–100)
MONOCYTES: 8 %
MPV: 9.9 FL (ref 7.5–12.5)
NEUTROPHILS: 52.7 %
NON-HDL CHOLESTEROL: 137 MG/DL (CALC)
PLATELET COUNT: 268 THOUSAND/UL (ref 140–400)
POTASSIUM: 4.6 MMOL/L (ref 3.5–5.3)
PROTEIN, TOTAL: 7.1 G/DL (ref 6.1–8.1)
PSA, TOTAL: 0.48 NG/ML
RDW: 12.6 % (ref 11–15)
RED BLOOD CELL COUNT: 4.84 MILLION/UL (ref 4.2–5.8)
SODIUM: 142 MMOL/L (ref 135–146)
TRIGLYCERIDES: 45 MG/DL
VITAMIN D, 25-OH, TOTAL: 53 NG/ML (ref 30–100)
WHITE BLOOD CELL COUNT: 7.4 THOUSAND/UL (ref 3.8–10.8)

## 2024-02-16 ENCOUNTER — HOSPITAL ENCOUNTER (OUTPATIENT)
Dept: GENERAL RADIOLOGY | Age: 59
Discharge: HOME | End: 2024-02-16
Attending: INTERNAL MEDICINE
Payer: COMMERCIAL

## 2024-02-16 DIAGNOSIS — G89.29 BILATERAL CHRONIC KNEE PAIN: ICD-10-CM

## 2024-02-16 DIAGNOSIS — M25.561 BILATERAL CHRONIC KNEE PAIN: ICD-10-CM

## 2024-02-16 DIAGNOSIS — M25.562 BILATERAL CHRONIC KNEE PAIN: ICD-10-CM

## 2024-02-16 PROCEDURE — 73562 X-RAY EXAM OF KNEE 3: CPT | Performed by: INTERNAL MEDICINE

## 2024-04-15 ENCOUNTER — TELEPHONE (OUTPATIENT)
Facility: LOCATION | Age: 59
End: 2024-04-15

## 2024-04-15 NOTE — TELEPHONE ENCOUNTER
Good morning, the patient recently called stating that his abscess on his buttock has resurface. H e stated that he is experiencing puss inside, discomfort when sitting down, and it has a dark reddish color. The patient stated that  told him to call and talk to a nurse if the abscess ever came back.    Call back # [phone]

## 2024-04-16 ENCOUNTER — OFFICE VISIT (OUTPATIENT)
Facility: LOCATION | Age: 59
End: 2024-04-16
Payer: COMMERCIAL

## 2024-04-16 VITALS — TEMPERATURE: 98 F | HEART RATE: 56 BPM

## 2024-04-16 DIAGNOSIS — K60.3 ANAL FISTULA: ICD-10-CM

## 2024-04-16 DIAGNOSIS — K61.0 PERIANAL ABSCESS: Primary | ICD-10-CM

## 2024-04-16 PROCEDURE — 99213 OFFICE O/P EST LOW 20 MIN: CPT | Performed by: STUDENT IN AN ORGANIZED HEALTH CARE EDUCATION/TRAINING PROGRAM

## 2024-04-16 NOTE — PROGRESS NOTES
New Patient Visit Note       Active Problems      1. Perianal abscess    2. Anal fistula        Chief Complaint   Chief Complaint   Patient presents with    Establish Care     EP- Perianal Abscess- pt states the abscess is back, it raptured this morning, lots of bleeding        History of Present Illness   This is a very nice 58-year-old gentleman with history of ulcerative colitis who I previously met 3/16/2023 when he was referred to me with concern for perianal abscess, possible anal fistula.  Patient is under the care of Dr. Delbert Valencia of [facility] gastroenterology.  When I first met the patient, there was a small skin opening in the right anterior anoderm about 3 cm from the anal verge where he had a spontaneously draining abscess.  I suspected he had an anal fistula at that time but was completely asymptomatic.  Therefore, we decided to pursue watchful observation.    Patient remained asymptomatic when I last saw him in June 2023.  He has maintained care with Dr. Valencia and is currently on Entyvio.  His most recent flexible sigmoidoscopy was performed on 2/8/2024 and showed mild Melchor 0-1 inflammation of the sigmoid colon and rectum.  Pathology from biopsies of the sigmoid colon and rectum showed features consistent with chronic inactive (quiescent) colitis.    Patient returns for follow-up today with concern for recurrent perianal abscess and possible fistula.  He has noticed swelling around his anus since the flexible sigmoidoscopy procedure.  It was not painful or draining until this last weekend.  He had increasing pain, swelling and redness over the last several days.  The area spontaneously opened today around 1.5 hours before being seen by me in the office this morning.  He describes the drainage as a mixture of blood and pus.  He did have pain relief with the drainage.  No fevers, urinary symptoms or change in bowel habits.      Allergies  Ameya has No Known Allergies.    Past Medical /  Surgical / Social / Family History    The past medical and past surgical history have been reviewed by me today.    Past Medical History:    Cellulitis    diagnosed 3 weeks ago, pt reports in between butt cheeks    Internal hemorrhoids    Perianal abscess    Uncomfortable fullness after meals    Visual impairment    Wears glasses     Past Surgical History:   Procedure Laterality Date    Colonoscopy  02/2023    Colonoscopy      Other surgical history  01/01/2000    B knee scopes       The family history and social history have been reviewed by me today.    Family History   Problem Relation Age of Onset    Heart Disorder Father         Valvular heart disease    Cancer Mother         pancreatic CA    Other (Healthy) Brother     Other (Hep C) Brother     Other (Healthy) Brother      Social History     Socioeconomic History    Marital status:    Tobacco Use    Smoking status: Former     Current packs/day: 1.00     Types: Cigarettes    Smokeless tobacco: Never   Vaping Use    Vaping status: Never Used   Substance and Sexual Activity    Alcohol use: Yes     Alcohol/week: 6.0 standard drinks of alcohol     Types: 6 Cans of beer per week     Comment: Occasional    Drug use: No   Other Topics Concern    Caffeine Concern Yes     Comment: 1-2 cups of coffee every morning    Exercise Yes    Seat Belt Yes    Special Diet Yes     Comment: low carb diet     Stress Concern No    Weight Concern No        Current Outpatient Medications:     Vedolizumab (ENTYVIO IV), Inject into the vein Every 8 (eight) weeks., Disp: , Rfl:     Ferrous Sulfate 325 (65 Fe) MG Oral Tab, Take 1 tablet (325 mg total) by mouth daily with breakfast., Disp: 90 tablet, Rfl: 0      Review of Systems  A 10 point review of systems was performed and negative unless otherwise documented per HPI.    Physical Findings   Pulse 56   Temp 97.5 °F (36.4 °C) (Temporal)   Physical Exam  Vitals and nursing note reviewed. Exam conducted with a chaperone present.    Constitutional:       General: He is not in acute distress.  HENT:      Head: Normocephalic and atraumatic.      Mouth/Throat:      Mouth: Mucous membranes are moist.   Cardiovascular:      Rate and Rhythm: Normal rate and regular rhythm.   Pulmonary:      Effort: Pulmonary effort is normal.   Abdominal:      General: There is no distension.      Palpations: Abdomen is soft.      Tenderness: There is no abdominal tenderness.   Genitourinary:     Comments: Patient examined in the prone jackknife position with nurse chaperone present.  There is a small skin opening in the right anterior anoderm around 3 cm from the anal verge.  I was able to express a small amount of blood-tinged serous fluid from this opening.  There is suggestion of a palpable cord tracking towards the anus.  There is no surrounding tenderness or skin changes.  Digital rectal exam reveals suggestion of an internal opening in the anterior midline anal canal within 1 cm of the anal verge.  No active bleeding, drainage or tenderness.  Musculoskeletal:         General: No deformity.   Skin:     General: Skin is warm and dry.   Neurological:      General: No focal deficit present.      Mental Status: He is alert.   Psychiatric:         Mood and Affect: Mood normal.             Assessment   1. Perianal abscess    2. Anal fistula        This is a very nice 58-year-old gentleman with history of ulcerative colitis who I previously met 3/16/2023 when he was referred to me with concern for perianal abscess, possible anal fistula.  Patient is under the care of Dr. Delbert Valencia of [facility] gastroenterology.  When I first met the patient, there was a small skin opening in the right anterior anoderm about 3 cm from the anal verge where he had a spontaneously draining abscess.  I suspected he had an anal fistula at that time but was completely asymptomatic.  Therefore, we decided to pursue watchful observation.    Patient remained asymptomatic when I last  saw him in June 2023.  He has maintained care with Dr. Valencia and is currently on Entyvio.  His most recent flexible sigmoidoscopy was performed on 2/8/2024 and showed mild Melchor 0-1 inflammation of the sigmoid colon and rectum.  Pathology from biopsies of the sigmoid colon and rectum showed features consistent with chronic inactive (quiescent) colitis.    Patient returns for follow-up today with concern for recurrent perianal abscess and possible fistula.  He has noticed swelling around his anus since the flexible sigmoidoscopy procedure.  It was not painful or draining until this last weekend.  He had increasing pain, swelling and redness over the last several days.  The area spontaneously opened today around 1.5 hours before being seen by me in the office this morning.  He describes the drainage as a mixture of blood and pus.  He did have pain relief with the drainage.  No fevers, urinary symptoms or change in bowel habits.    On exam today, there is a small skin opening in the right anterior anoderm around 3 cm from the anal verge.  I was able to express a small amount of blood-tinged serous fluid from this opening.  There is suggestion of a palpable cord tracking towards the anus.  There is no surrounding tenderness or skin changes.  Digital rectal exam reveals suggestion of an internal opening in the anterior midline anal canal within 1 cm of the anal verge.  No active bleeding, drainage or tenderness.    Plan  I counseled patient and his wife that I do suspect he has an anal fistula based on the fact that he has had recurrent small perianal abscesses with swelling and drainage in the same location.  That being said, the fistula has remained quiescent for around 1 year and even now his symptoms are quite minimal.  I discussed options of watchful observation versus surgical intervention with the patient and his wife.  Patient is reluctant to have surgery for the anal fistula given the fact that his symptoms  are mild and infrequent.  Patient's wife also has had a history of anal fistulas requiring multiple operations.  She seemed to have a bad experience with her fistula surgeries and has advised him to not have surgery if his symptoms remain mild and infrequent.    I do think it is reasonable to watchfully observe the patient for now.  I would like to see him back in 1 month's time for ongoing follow-up.  He is welcome to see me sooner if he has worsening anorectal symptoms in the interim.  Patient and wife expressed understanding and were agreeable to plan.     No orders of the defined types were placed in this encounter.      Imaging & Referrals   None    Follow Up  No follow-ups on file.    William Haines MD

## 2024-05-13 ENCOUNTER — OFFICE VISIT (OUTPATIENT)
Facility: LOCATION | Age: 59
End: 2024-05-13
Payer: COMMERCIAL

## 2024-05-13 VITALS — HEART RATE: 59 BPM | TEMPERATURE: 98 F

## 2024-05-13 DIAGNOSIS — K60.3 ANAL FISTULA: Primary | ICD-10-CM

## 2024-05-13 NOTE — PROGRESS NOTES
New Patient Visit Note       Active Problems      1. Anal fistula        Chief Complaint   Chief Complaint   Patient presents with    [facility] Care     EP- 1 month f/u Perianal abscess- pt denies pain or drainage        History of Present Illness   This is a very nice 58-year-old gentleman with history of ulcerative colitis who I previously met 3/16/2023 when he was referred to me with concern for perianal abscess, possible anal fistula.  Patient is under the care of Dr. Delbert Valencia of [facility] gastroenterology.  When I first met the patient, there was a small skin opening in the right anterior anoderm about 3 cm from the anal verge where he had a spontaneously draining abscess.  I suspected he had an anal fistula at that time but was completely asymptomatic.  Therefore, we decided to pursue watchful observation.    His most recent flexible sigmoidoscopy was performed on 2/8/2024 and showed mild Melchor 0-1 inflammation of the sigmoid colon and rectum.  Pathology from biopsies of the sigmoid colon and rectum showed features consistent with chronic inactive (quiescent) colitis.    I last saw the patient on 4/16/2024 when he presented to me with concern for a recurrent right anterior perianal abscess and possible fistula.  He had recurrent pain, swelling and eventual drainage in the area.  By the time I saw him, his symptoms were quite minimal and patient wished to proceed with further watchful observation.  Patient returns for follow-up today.    Patient currently has no anorectal symptoms.  He denies any ongoing pain, swelling, fevers or drainage.    Allergies  Ameya has No Known Allergies.    Past Medical / Surgical / Social / Family History    The past medical and past surgical history have been reviewed by me today.    Past Medical History:    Cellulitis    diagnosed 3 weeks ago, pt reports in between butt cheeks    Internal hemorrhoids    Perianal abscess    Uncomfortable fullness after meals    Visual  impairment    Wears glasses     Past Surgical History:   Procedure Laterality Date    Colonoscopy  02/2023    Colonoscopy      Other surgical history  01/01/2000    B knee scopes       The family history and social history have been reviewed by me today.    Family History   Problem Relation Age of Onset    Heart Disorder Father         Valvular heart disease    Cancer Mother         pancreatic CA    Other (Healthy) Brother     Other (Hep C) Brother     Other (Healthy) Brother      Social History     Socioeconomic History    Marital status:    Tobacco Use    Smoking status: Former     Current packs/day: 1.00     Types: Cigarettes    Smokeless tobacco: Never   Vaping Use    Vaping status: Never Used   Substance and Sexual Activity    Alcohol use: Yes     Alcohol/week: 6.0 standard drinks of alcohol     Types: 6 Cans of beer per week     Comment: Occasional    Drug use: No   Other Topics Concern    Caffeine Concern Yes     Comment: 1-2 cups of coffee every morning    Exercise Yes    Seat Belt Yes    Special Diet Yes     Comment: low carb diet     Stress Concern No    Weight Concern No        Current Outpatient Medications:     Vedolizumab (ENTYVIO IV), Inject into the vein Every 8 (eight) weeks., Disp: , Rfl:       Review of Systems  A 10 point review of systems was performed and negative unless otherwise documented per HPI.    Physical Findings   Pulse 59   Temp 97.5 °F (36.4 °C) (Temporal)   Physical Exam  Vitals and nursing note reviewed. Exam conducted with a chaperone present.   Constitutional:       General: He is not in acute distress.  HENT:      Head: Normocephalic and atraumatic.      Mouth/Throat:      Mouth: Mucous membranes are moist.   Cardiovascular:      Rate and Rhythm: Normal rate and regular rhythm.   Pulmonary:      Effort: Pulmonary effort is normal.   Abdominal:      General: There is no distension.      Palpations: Abdomen is soft.      Tenderness: There is no abdominal tenderness.    Genitourinary:     Comments: Patient examined in the prone jackknife position with nurse chaperone present.  External exam of the anus reveals persistent mild induration along the right anterior anoderm.  There is suggestion of a palpable cord tracking towards the anus.  There is no visible skin opening, drainage or tenderness.  Musculoskeletal:         General: No deformity.   Skin:     General: Skin is warm and dry.   Neurological:      General: No focal deficit present.      Mental Status: He is alert.   Psychiatric:         Mood and Affect: Mood normal.             Assessment   1. Anal fistula        This is a very nice 58-year-old gentleman with history of ulcerative colitis who I previously met 3/16/2023 when he was referred to me with concern for perianal abscess, possible anal fistula.  Patient is under the care of Dr. Delbert Valencia of [facility] gastroenterology.  When I first met the patient, there was a small skin opening in the right anterior anoderm about 3 cm from the anal verge where he had a spontaneously draining abscess.  I suspected he had an anal fistula at that time but was completely asymptomatic.  Therefore, we decided to pursue watchful observation.    His most recent flexible sigmoidoscopy was performed on 2/8/2024 and showed mild Melchor 0-1 inflammation of the sigmoid colon and rectum.  Pathology from biopsies of the sigmoid colon and rectum showed features consistent with chronic inactive (quiescent) colitis.    I last saw the patient on 4/16/2024 when he presented to me with concern for a recurrent right anterior perianal abscess and possible fistula.  He had recurrent pain, swelling and eventual drainage in the area.  By the time I saw him, his symptoms were quite minimal and patient wished to proceed with further watchful observation.  Patient returns for follow-up today.    Patient currently has no anorectal symptoms.  He denies any ongoing pain, swelling, fevers or drainage.    On  exam today, there is persistent mild induration along the right anterior anoderm.  There is suggestion of a palpable cord tracking towards the anus.  There is no visible skin opening, drainage or tenderness.    Plan  I counseled patient again that I do suspect he has an anal fistula based on the fact that he has had recurrent small perianal abscesses with swelling and drainage in the same location.  That being said, the fistula has remained quiescent for around 1 year and his symptoms have completely resolved at this time.  I discussed options of watchful observation versus surgical intervention.  Patient is reluctant to have surgery for the anal fistula given the fact that he is completely asymptomatic at this time. Patient's wife also has had a history of anal fistulas requiring multiple operations.  She seemed to have a bad experience with her fistula surgeries and has advised him to not have surgery if his symptoms remain mild and infrequent.    I do think it is reasonable to watchfully observe the patient for now.  He is welcome to see me at any time if he has recurrent anorectal symptoms.  I will likely recommend further surgical exploration should he develop recurrent symptoms again within the next year.  Patient expressed understanding and was agreeable to plan.     No orders of the defined types were placed in this encounter.      Imaging & Referrals   None    Follow Up  No follow-ups on file.    William Haines MD

## 2024-11-21 ENCOUNTER — TELEPHONE (OUTPATIENT)
Dept: ORTHOPEDICS CLINIC | Facility: CLINIC | Age: 59
End: 2024-11-21

## 2024-11-21 DIAGNOSIS — M25.561 PAIN IN BOTH KNEES, UNSPECIFIED CHRONICITY: Primary | ICD-10-CM

## 2024-11-21 DIAGNOSIS — M25.562 PAIN IN BOTH KNEES, UNSPECIFIED CHRONICITY: Primary | ICD-10-CM

## 2024-11-21 NOTE — TELEPHONE ENCOUNTER
Patient wife called for husbands marjorie knee pain. Please advise for X-rays   Future Appointments   Date Time Provider Department Center   12/23/2024  1:00 PM INFUSION [facility] NURSE [facility] SUB GI   1/17/2025  9:00 AM Antwan Clark MD [facility] ORTHO [facility]   2/11/2025  1:20 PM Delbert Valencia MD [facility] SUB GI

## 2024-12-11 PROBLEM — K51.011: Status: ACTIVE | Noted: 2024-12-11

## 2024-12-11 PROCEDURE — 88342 IMHCHEM/IMCYTCHM 1ST ANTB: CPT | Performed by: INTERNAL MEDICINE

## 2024-12-11 PROCEDURE — 88341 IMHCHEM/IMCYTCHM EA ADD ANTB: CPT | Performed by: INTERNAL MEDICINE

## 2025-01-17 ENCOUNTER — OFFICE VISIT (OUTPATIENT)
Dept: ORTHOPEDICS CLINIC | Facility: CLINIC | Age: 60
End: 2025-01-17
Payer: COMMERCIAL

## 2025-01-17 ENCOUNTER — HOSPITAL ENCOUNTER (OUTPATIENT)
Dept: GENERAL RADIOLOGY | Age: 60
Discharge: HOME | End: 2025-01-17
Attending: ORTHOPAEDIC SURGERY
Payer: COMMERCIAL

## 2025-01-17 VITALS — WEIGHT: 178.19 LBS | HEIGHT: 69.5 IN | BODY MASS INDEX: 25.8 KG/M2

## 2025-01-17 DIAGNOSIS — M25.562 PAIN IN BOTH KNEES, UNSPECIFIED CHRONICITY: ICD-10-CM

## 2025-01-17 DIAGNOSIS — M25.561 PAIN IN BOTH KNEES, UNSPECIFIED CHRONICITY: ICD-10-CM

## 2025-01-17 DIAGNOSIS — M17.0 PRIMARY OSTEOARTHRITIS OF BOTH KNEES: Primary | ICD-10-CM

## 2025-01-17 PROCEDURE — 99204 OFFICE O/P NEW MOD 45 MIN: CPT | Performed by: ORTHOPAEDIC SURGERY

## 2025-01-17 PROCEDURE — 20610 DRAIN/INJ JOINT/BURSA W/O US: CPT | Performed by: ORTHOPAEDIC SURGERY

## 2025-01-17 PROCEDURE — 73564 X-RAY EXAM KNEE 4 OR MORE: CPT | Performed by: ORTHOPAEDIC SURGERY

## 2025-01-17 RX ORDER — TRIAMCINOLONE ACETONIDE 40 MG/ML
80 INJECTION, SUSPENSION INTRA-ARTICULAR; INTRAMUSCULAR ONCE
Status: COMPLETED | OUTPATIENT
Start: 2025-01-17 | End: 2025-01-17

## 2025-01-17 RX ADMIN — TRIAMCINOLONE ACETONIDE 80 MG: 40 INJECTION, SUSPENSION INTRA-ARTICULAR; INTRAMUSCULAR at 09:55:00

## 2025-01-17 NOTE — PROCEDURES
Risks and benefits of knee injection discussed with the patient, with risks including but not limited to pain and swelling at the injection site and/or within the knee joint, infection, elevation in blood pressure and/or glucose levels, facial flushing.  After informed consent, the patient's right and left knees were marked, locally anesthetized with skin refrigerant, prepped with topical antiseptic, and injected with a mixture of 1mL 40mg/mL Kenalog, 2mL 1% lidocaine and 2mL 0.5% marcaine through the inferolateral portal.  A band-aid was applied.  The patient tolerated the procedure well.    Antwan Clark MD, FAAOS  [facility] Orthopedic Surgery  Phone [phone]  Fax [phone]

## 2025-05-01 ENCOUNTER — HOSPITAL ENCOUNTER (OUTPATIENT)
Dept: CT IMAGING | Facility: HOSPITAL | Age: 60
Discharge: HOME | End: 2025-05-01
Attending: INTERNAL MEDICINE
Payer: COMMERCIAL

## 2025-05-01 DIAGNOSIS — K51.018 ULCERATIVE PANCOLITIS WITH OTHER COMPLICATION (HCC): ICD-10-CM

## 2025-05-01 DIAGNOSIS — K92.1 HEMATOCHEZIA: ICD-10-CM

## 2025-05-01 DIAGNOSIS — R19.7 DIARRHEA, UNSPECIFIED TYPE: ICD-10-CM

## 2025-05-01 DIAGNOSIS — K60.30 ANAL FISTULA: ICD-10-CM

## 2025-05-01 PROCEDURE — 74177 CT ABD & PELVIS W/CONTRAST: CPT | Performed by: INTERNAL MEDICINE

## 2025-05-09 ENCOUNTER — ANESTHESIA EVENT (OUTPATIENT)
Dept: ENDOSCOPY | Facility: HOSPITAL | Age: 60
End: 2025-05-09
Payer: COMMERCIAL

## 2025-05-09 ENCOUNTER — HOSPITAL ENCOUNTER (INPATIENT)
Facility: HOSPITAL | Age: 60
LOS: 2 days | Discharge: HOME | End: 2025-05-11
Attending: INTERNAL MEDICINE | Admitting: INTERNAL MEDICINE
Payer: COMMERCIAL

## 2025-05-09 ENCOUNTER — ANESTHESIA (OUTPATIENT)
Dept: ENDOSCOPY | Facility: HOSPITAL | Age: 60
End: 2025-05-09
Payer: COMMERCIAL

## 2025-05-09 DIAGNOSIS — R19.7 DIARRHEA, UNSPECIFIED TYPE: ICD-10-CM

## 2025-05-09 DIAGNOSIS — K60.30 ANAL FISTULA: ICD-10-CM

## 2025-05-09 DIAGNOSIS — K64.9 HEMORRHOIDS, UNSPECIFIED HEMORRHOID TYPE: ICD-10-CM

## 2025-05-09 DIAGNOSIS — K51.211 ULCERATIVE PROCTITIS WITH RECTAL BLEEDING (HCC): ICD-10-CM

## 2025-05-09 DIAGNOSIS — K92.1 HEMATOCHEZIA: ICD-10-CM

## 2025-05-09 PROBLEM — K51.919 EXACERBATION OF ULCERATIVE COLITIS WITH COMPLICATION (HCC): Status: ACTIVE | Noted: 2025-05-09

## 2025-05-09 LAB
ALBUMIN SERPL-MCNC: 4.3 G/DL (ref 3.2–4.8)
ALBUMIN/GLOB SERPL: 1.7 {RATIO} (ref 1–2)
ALP LIVER SERPL-CCNC: 58 U/L (ref 45–117)
ALT SERPL-CCNC: 37 U/L (ref 10–49)
ANION GAP SERPL CALC-SCNC: 7 MMOL/L (ref 0–18)
AST SERPL-CCNC: 26 U/L (ref ?–34)
BASOPHILS # BLD AUTO: 0.06 X10(3) UL (ref 0–0.2)
BASOPHILS NFR BLD AUTO: 1.1 %
BILIRUB SERPL-MCNC: 0.6 MG/DL (ref 0.3–1.2)
BUN BLD-MCNC: 7 MG/DL (ref 9–23)
CALCIUM BLD-MCNC: 9.2 MG/DL (ref 8.7–10.6)
CHLORIDE SERPL-SCNC: 106 MMOL/L (ref 98–112)
CO2 SERPL-SCNC: 28 MMOL/L (ref 21–32)
CREAT BLD-MCNC: 0.74 MG/DL (ref 0.7–1.3)
EGFRCR SERPLBLD CKD-EPI 2021: 104 ML/MIN/1.73M2 (ref 60–?)
EOSINOPHIL # BLD AUTO: 0.2 X10(3) UL (ref 0–0.7)
EOSINOPHIL NFR BLD AUTO: 3.5 %
ERYTHROCYTE [DISTWIDTH] IN BLOOD BY AUTOMATED COUNT: 13.3 %
GLOBULIN PLAS-MCNC: 2.5 G/DL (ref 2–3.5)
GLUCOSE BLD-MCNC: 78 MG/DL (ref 70–99)
HCT VFR BLD AUTO: 43 % (ref 39–53)
HGB BLD-MCNC: 14.7 G/DL (ref 13–17.5)
IMM GRANULOCYTES # BLD AUTO: 0.02 X10(3) UL (ref 0–1)
IMM GRANULOCYTES NFR BLD: 0.4 %
LYMPHOCYTES # BLD AUTO: 1.71 X10(3) UL (ref 1–4)
LYMPHOCYTES NFR BLD AUTO: 30 %
MCH RBC QN AUTO: 30.4 PG (ref 26–34)
MCHC RBC AUTO-ENTMCNC: 34.2 G/DL (ref 31–37)
MCV RBC AUTO: 88.8 FL (ref 80–100)
MONOCYTES # BLD AUTO: 0.59 X10(3) UL (ref 0.1–1)
MONOCYTES NFR BLD AUTO: 10.4 %
NEUTROPHILS # BLD AUTO: 3.12 X10 (3) UL (ref 1.5–7.7)
NEUTROPHILS # BLD AUTO: 3.12 X10(3) UL (ref 1.5–7.7)
NEUTROPHILS NFR BLD AUTO: 54.6 %
OSMOLALITY SERPL CALC.SUM OF ELEC: 289 MOSM/KG (ref 275–295)
PLATELET # BLD AUTO: 291 10(3)UL (ref 150–450)
POTASSIUM SERPL-SCNC: 3.6 MMOL/L (ref 3.5–5.1)
PROT SERPL-MCNC: 6.8 G/DL (ref 5.7–8.2)
RBC # BLD AUTO: 4.84 X10(6)UL (ref 4.3–5.7)
SODIUM SERPL-SCNC: 141 MMOL/L (ref 136–145)
WBC # BLD AUTO: 5.7 X10(3) UL (ref 4–11)

## 2025-05-09 PROCEDURE — 0DBP8ZX EXCISION OF RECTUM, VIA NATURAL OR ARTIFICIAL OPENING ENDOSCOPIC, DIAGNOSTIC: ICD-10-PCS | Performed by: INTERNAL MEDICINE

## 2025-05-09 PROCEDURE — 0DBL8ZX EXCISION OF TRANSVERSE COLON, VIA NATURAL OR ARTIFICIAL OPENING ENDOSCOPIC, DIAGNOSTIC: ICD-10-PCS | Performed by: INTERNAL MEDICINE

## 2025-05-09 PROCEDURE — 0DBN8ZX EXCISION OF SIGMOID COLON, VIA NATURAL OR ARTIFICIAL OPENING ENDOSCOPIC, DIAGNOSTIC: ICD-10-PCS | Performed by: INTERNAL MEDICINE

## 2025-05-09 PROCEDURE — 0DBM8ZX EXCISION OF DESCENDING COLON, VIA NATURAL OR ARTIFICIAL OPENING ENDOSCOPIC, DIAGNOSTIC: ICD-10-PCS | Performed by: INTERNAL MEDICINE

## 2025-05-09 PROCEDURE — 99222 1ST HOSP IP/OBS MODERATE 55: CPT | Performed by: INTERNAL MEDICINE

## 2025-05-09 RX ORDER — ACETAMINOPHEN 500 MG
500 TABLET ORAL EVERY 4 HOURS PRN
Status: DISCONTINUED | OUTPATIENT
Start: 2025-05-09 | End: 2025-05-11

## 2025-05-09 RX ORDER — ONDANSETRON 2 MG/ML
4 INJECTION INTRAMUSCULAR; INTRAVENOUS EVERY 6 HOURS PRN
Status: DISCONTINUED | OUTPATIENT
Start: 2025-05-09 | End: 2025-05-11

## 2025-05-09 RX ORDER — METHYLPREDNISOLONE SODIUM SUCCINATE 40 MG/ML
20 INJECTION INTRAMUSCULAR; INTRAVENOUS EVERY 8 HOURS
Status: DISCONTINUED | OUTPATIENT
Start: 2025-05-09 | End: 2025-05-11

## 2025-05-09 RX ORDER — ENOXAPARIN SODIUM 100 MG/ML
40 INJECTION SUBCUTANEOUS DAILY
Status: DISCONTINUED | OUTPATIENT
Start: 2025-05-09 | End: 2025-05-11

## 2025-05-09 RX ORDER — NALOXONE HYDROCHLORIDE 0.4 MG/ML
0.08 INJECTION, SOLUTION INTRAMUSCULAR; INTRAVENOUS; SUBCUTANEOUS ONCE AS NEEDED
Status: DISCONTINUED | OUTPATIENT
Start: 2025-05-09 | End: 2025-05-09 | Stop reason: HOSPADM

## 2025-05-09 RX ORDER — LIDOCAINE HYDROCHLORIDE 10 MG/ML
INJECTION, SOLUTION EPIDURAL; INFILTRATION; INTRACAUDAL; PERINEURAL AS NEEDED
Status: DISCONTINUED | OUTPATIENT
Start: 2025-05-09 | End: 2025-05-09 | Stop reason: SURG

## 2025-05-09 RX ORDER — SODIUM CHLORIDE, SODIUM LACTATE, POTASSIUM CHLORIDE, CALCIUM CHLORIDE 600; 310; 30; 20 MG/100ML; MG/100ML; MG/100ML; MG/100ML
INJECTION, SOLUTION INTRAVENOUS CONTINUOUS
Status: DISCONTINUED | OUTPATIENT
Start: 2025-05-09 | End: 2025-05-11

## 2025-05-09 RX ORDER — SODIUM CHLORIDE 9 MG/ML
INJECTION, SOLUTION INTRAVENOUS CONTINUOUS
Status: DISCONTINUED | OUTPATIENT
Start: 2025-05-09 | End: 2025-05-11

## 2025-05-09 RX ADMIN — LIDOCAINE HYDROCHLORIDE 50 MG: 10 INJECTION, SOLUTION EPIDURAL; INFILTRATION; INTRACAUDAL; PERINEURAL at 10:34:00

## 2025-05-09 NOTE — CONSULTS
[facility]                       Gastroenterology Consultation-[facility] Gastroenterology    Ameya Kelly Patient Status:  Hospital Outpatient Surgery    1965 MRN [id number]   Location [facility] ENDOSCOPY PAIN CENTER Attending Delbert Valencia MD   Hosp Day # 0 PCP Shruthi Downey MD         Reason for consultation: IBD flare    HPI:  59 year old male with history of HLD presenting for flare of of rectosigmoid ulcerative colitis.  Patient with complicated course that has been complicated by anal fistula.  Patient has been having 7-9 bowel movements daily and underwent colonoscopy today.  Colonoscopy with Melchor 3 ulcerative colitis.  Decision was made to admit for IV steroids and possible initiation of IV Remicade.  Patient is been on Entyvio and was recently switched last year to Skyrizi.  Patient with hematochezia but no abdominal pain at this time.  Recent CT abdomen pelvis with signs of fistula but no abscess noted.    PMHx: Past Medical History[1]    PSHx: Past Surgical History[2]    Medications: Current Medications[3]    Allergies: Allergies[4]    SocHx:  Short Social Hx on File[5]      FamHx:  Family History[6]      ROS:  In addition to the pertinent positives described above:            Infectious Disease:  No chronic infections or recent fevers prior to the acute illness            Cardiovascular: No history of CAD, prior MI, chest pain, or palpitations            Respiratory: No shortness of breath, asthma, copd, recurrent pneumonia            Hematologic: The patient reports no easy bruising, frequent gum bleeding or nose bleeding;  The patient has no history of known chronic anemia            Dermatologic: The patient reports no recent rashes or chronic skin disorders            Rheumatologic: The patient reports no history of chronic arthritis, myalgias, arthralgias            Genitourinary:  The patient reports no history of recurrent urinary tract infections,  hematuria, dysuria, or nephrolithiasis           Psychiatric: The patient reports no history of depression, anxiety, suicidal ideation, or homicidal ideation           Oncologic: The patient reports on history of prior solid tumor or hematologic malignancy           ENT: The patient reports no hoarseness of voice, hearing loss, sinus congestion, tinnitus           Neurologic: The patient reports no history of seizure, stroke, or frequent headaches      PE: /53   Pulse 64   Temp 98.5 °F (36.9 °C) (Temporal)   Resp 20   Ht 5' 9\" (1.753 m)   Wt 165 lb (74.8 kg)   SpO2 100%   BMI 24.37 kg/m²     Gen: AAO x 3, NAD   CV: Regular rate and rhythm  Resp: Clear to auscultation bilaterally  Abdomen: Soft, non-tender, non-distended with the presence of bowel sounds; No hepatosplenomegaly; no rebound or guarding; No ascites is clinically apparent; no tympany to percussion  Ext: No peripheral edema or cyanosis  Skin: Warm and dry  Psychiatric: Appropriate mood and congruent affect without obvious depression or anxiety    Labs:      No results for input(s): \"GLU\", \"BUN\", \"CREATSERUM\", \"GFRAA\", \"GFRNAA\", \"CA\", \"NA\", \"K\", \"CL\", \"CO2\" in the last 168 hours.  No results for input(s): \"RBC\", \"HGB\", \"HCT\", \"MCV\", \"MCH\", \"MCHC\", \"RDW\", \"NEPRELIM\", \"WBC\", \"PLT\" in the last 168 hours.    No results for input(s): \"ALKPHOS\", \"BILITOT\", \"ALT\", \"AST\" in the last 168 hours.    Invalid input(s): \"BILIDIR\", \"PROT\", \"LABALBU\"      Assessment and Plan:   - Patient with active severe flare of ulcerative colitis; given presence of fistula, there has been some thought that his disease has progressed or changed into Crohn's  - Pathology from colonoscopy pending  - Would look to repeat C. difficile; this test was negative 4/28  - Would look to initiate IV Solu-Medrol 20 mg 3 times daily pending C. Difficile  - Would look to initiate IV Remicade while in house  - If no improvement in symptoms, would look to transfer to [facility]  [place]  - Would look to obtain MR pelvis while in house      Thank you for the consultation, we will follow the patient with you.    Delbert Valencia MD  1:08 PM  5/9/2025  [facility]astroenter[facility]  [phone]             [1]   Past Medical History:   Anesthesia complication    started twitching during sigmoidoscopy when lidocaine and propofol administered    Cellulitis    diagnosed 3 weeks ago, pt reports in between butt cheeks    Diarrhea, unspecified    Internal hemorrhoids    Irregular bowel habits    Perianal abscess    Ulcerative colitis (HCC)    Uncomfortable fullness after meals    Visual impairment    Wears glasses   [2]   Past Surgical History:  Procedure Laterality Date    Colonoscopy  02/2023    Colonoscopy      Other surgical history  01/01/2000    B knee scopes    Sigmoidoscopy,diagnostic     [3]    lactated ringers infusion   Intravenous Continuous    lactated ringers infusion   Intravenous Continuous    naloxone (Narcan) 0.4 MG/ML injection 0.08 mg  0.08 mg Intravenous Once PRN    sodium chloride 0.9% infusion   Intravenous Continuous    enoxaparin (Lovenox) 40 MG/0.4ML SUBQ injection 40 mg  40 mg Subcutaneous Daily    acetaminophen (Tylenol Extra Strength) tab 500 mg  500 mg Oral Q4H PRN    melatonin tab 3 mg  3 mg Oral Nightly PRN    ondansetron (Zofran) 4 MG/2ML injection 4 mg  4 mg Intravenous Q6H PRN   [4]   Allergies  Allergen Reactions    Lidocaine OTHER (SEE COMMENTS)     Twitching     Propofol OTHER (SEE COMMENTS)     twitching   [5]   Social History  Socioeconomic History    Marital status:    Tobacco Use    Smoking status: Former     Current packs/day: 1.00     Types: Cigarettes     Passive exposure: Past    Smokeless tobacco: Never    Tobacco comments:     Updated 1/17/25   Vaping Use    Vaping status: Never Used   Substance and Sexual Activity    Alcohol use: Yes     Alcohol/week: 6.0 - 12.0 standard drinks of alcohol     Types: 6 - 12 Cans of beer per week     Comment:  Occasional    Drug use: No   Other Topics Concern    Caffeine Concern Yes     Comment: 1-2 cups of coffee every morning    Exercise Yes    Seat Belt Yes    Special Diet Yes     Comment: low carb diet     Stress Concern No    Weight Concern No   [6]   Family History  Problem Relation Age of Onset    Heart Disorder Father         Valvular heart disease    Cancer Mother         pancreatic CA    Other (Healthy) Brother     Other (Hep C) Brother     Other (Healthy) Brother

## 2025-05-09 NOTE — ANESTHESIA POSTPROCEDURE EVALUATION
[facility]    Ameya Kelly Patient Status:  Hospital Outpatient Surgery   Age/Gender 59 year old male MRN [id number]   Location [facility] ENDOSCOPY PAIN CENTER Attending Delbert Valencia MD   Hosp Day # 0 PCP Shruthi Downey MD       Anesthesia Post-op Note    COLONOSCOPY WITH BIOPSIES    Procedure Summary       Date: 05/09/25 Room / Location:  ENDOSCOPY 02 / [facility] ENDOSCOPY    Anesthesia Start: 1031 Anesthesia Stop:     Procedure: COLONOSCOPY WITH BIOPSIES Diagnosis:       Anal fistula      Ulcerative proctitis with rectal bleeding (HCC)      Diarrhea, unspecified type      Hemorrhoids, unspecified hemorrhoid type      Hematochezia      (IBD)    Surgeons: Delbert Valencia MD Anesthesiologist: Rene Belcher MD    Anesthesia Type: MAC ASA Status: 2            Anesthesia Type: MAC    Vitals Value Taken Time   BP 97/55 05/09/25 11:03   Temp na 05/09/25 11:03   Pulse 62 05/09/25 11:03   Resp 16 05/09/25 11:03   SpO2 100 05/09/25 11:03           Patient Location: Endoscopy    Anesthesia Type: MAC    Airway Patency: patent    Postop Pain Control: adequate    Mental Status: mildly sedated but able to meaningfully participate in the post-anesthesia evaluation    Nausea/Vomiting: none    Cardiopulmonary/Hydration status: stable euvolemic    Complications: no apparent anesthesia related complications    Postop vital signs: stable    Dental Exam: Unchanged from Preop    Patient to be discharged from PACU when criteria met.

## 2025-05-09 NOTE — ANESTHESIA PREPROCEDURE EVALUATION
PRE-OP EVALUATION    Patient Name: Ameya Kelly    Admit Diagnosis: Anal fistula [K60.30]  Ulcerative proctitis with rectal bleeding (HCC) [K51.211]  Diarrhea, unspecified type [R19.7]  Hemorrhoids, unspecified hemorrhoid type [K64.9]  Hematochezia [K92.1]    Pre-op Diagnosis: Anal fistula [K60.30]  Ulcerative proctitis with rectal bleeding (HCC) [K51.211]  Diarrhea, unspecified type [R19.7]  Hemorrhoids, unspecified hemorrhoid type [K64.9]  Hematochezia [K92.1]    COLONOSCOPY    Anesthesia Procedure: COLONOSCOPY    Surgeons and Role:     * Delbert Valencia MD - Primary    Pre-op vitals reviewed.  Temp: 98.5 °F (36.9 °C)  Pulse: 56  Resp: 20  BP: 130/77  SpO2: 100 %  Body mass index is 24.37 kg/m².    Current medications reviewed.  Hospital Medications:  Current Medications[1]    Outpatient Medications:   Prescriptions Prior to Admission[2]    Allergies: Lidocaine and Propofol      Anesthesia Evaluation    Patient summary reviewed.    Anesthetic Complications  (-) history of anesthetic complications         GI/Hepatic/Renal                          (+) ulcerative colitis       Cardiovascular      ECG reviewed.  Exercise tolerance: good     MET: >4         (+) hyperlipidemia                                  Endo/Other    Negative endo/other ROS.                              Pulmonary    Negative pulmonary ROS.                       Neuro/Psych                                  Past Surgical History[3]  Social Hx on file[4]  History   Drug Use No     Available pre-op labs reviewed.  Lab Results   Component Value Date    WBC 5.8 04/25/2025    RBC 4.70 04/25/2025    HGB 14.2 04/25/2025    HCT 43.2 04/25/2025    MCV 91.9 04/25/2025    MCH 30.2 04/25/2025    MCHC 32.9 04/25/2025    RDW 12.9 04/25/2025     04/25/2025     Lab Results   Component Value Date     04/25/2025    K 4.4 04/25/2025     04/25/2025    CO2 28 04/25/2025    BUN 20 04/25/2025    CREATSERUM 0.68 (L) 04/25/2025    GLU 84 04/25/2025     CA 9.6 04/25/2025            Airway      Mallampati: II  Mouth opening: >3 FB  TM distance: > 6 cm  Neck ROM: full Cardiovascular    Cardiovascular exam normal.  Rhythm: regular  Rate: normal     Dental             Pulmonary    Pulmonary exam normal.  Breath sounds clear to auscultation bilaterally.               Other findings        ASA: 2   Plan: MAC  NPO status verified and patient meets guidelines.    Post-procedure pain management plan discussed with surgeon and patient.      Plan/risks discussed with: patient            Present on Admission:  **None**               [1]  • lactated ringers infusion   Intravenous Continuous   [2]  Facility-Administered Medications Prior to Admission   Medication Dose Route Frequency Provider Last Rate Last Admin   • [COMPLETED] Risankizumab-rzaa (Skyrizi) 1,200 mg in dextrose 5% 270 mL infusion  1,200 mg Intravenous Once Delbert Valencia MD   Stopped at 02/17/25 1005     Medications Prior to Admission   Medication Sig Dispense Refill Last Dose/Taking   • PEG 3350-KCl-Na Bicarb-NaCl 420 g Oral Recon Soln Take 4,000 mL by mouth As Directed. 4000 mL 0 5/9/2025 Morning   • Sildenafil Citrate (VIAGRA) 100 MG Oral Tab Take 1 tablet (100 mg total) by mouth daily as needed for Erectile Dysfunction. 30 tablet 1 Past Week   • Risankizumab-rzaa (SKYRIZI) 360 MG/2.4ML Subcutaneous Solution Cartridge On week 12 inject 360mg subcutaneous and then every 8 weeks thereafter 1 each 5 More than a month   • Risankizumab-rzaa (SKYRIZI IV) Inject into the vein. (Patient not taking: Reported on 5/6/2025)   Not Taking   [3]  Past Surgical History:  Procedure Laterality Date   • Colonoscopy  02/2023   • Colonoscopy     • Other surgical history  01/01/2000    B knee scopes   • Sigmoidoscopy,diagnostic     [4]  Social History  Socioeconomic History   • Marital status:    Tobacco Use   • Smoking status: Former     Current packs/day: 1.00     Types: Cigarettes     Passive exposure: Past   •  Smokeless tobacco: Never   • Tobacco comments:     Updated 1/17/25   Vaping Use   • Vaping status: Never Used   Substance and Sexual Activity   • Alcohol use: Yes     Alcohol/week: 6.0 - 12.0 standard drinks of alcohol     Types: 6 - 12 Cans of beer per week     Comment: Occasional   • Drug use: No   Other Topics Concern   • Caffeine Concern Yes     Comment: 1-2 cups of coffee every morning   • Exercise Yes   • Seat Belt Yes   • Special Diet Yes     Comment: low carb diet    • Stress Concern No   • Weight Concern No

## 2025-05-09 NOTE — PLAN OF CARE
Pt Aox4  Pt denies pain  RA   Iv steroids  Iv remicade  started   Pt tolerating diet  All of pt and family questions answered

## 2025-05-09 NOTE — OPERATIVE REPORT
Operative Report-Colonoscopy    PREOPERATIVE DIAGNOSIS/INDICATION: IBD    POSTOPERTATIVE DIAGNOSIS: Melchor 3 Ulcerative Colitis, colon ulcers     PROCEDURE PERFORMED: COLONOSCOPY    INFORMED CONSENT:  Once a brief history and physical examination was performed, the risks, benefits and alternatives to the procedure were discussed with the patient and/or family and informed consent was obtained. The risks of sedation, perforation, missed lesions and need for surgery were all discussed.  Patient expressed understanding of the risks and agreed to proceed.      PROCEDURE DESCRIPTION:The patient was then brought to the endoscopy suite where his pulse, pulse oximetry and blood pressure were monitored. Patient was placed in the left lateral decubitus position and deep sedation was administered. Once adequate sedation was achieved, a rectal exam was performed which was normal. A lubricated tip of an Olympus video colonoscope was then inserted through the rectum and gently manipulated under direct visualization to the cecal pole and the terminal ileum. The quality of the preparation was fair. Upon withdrawal of the colonoscope, careful visualization of the mucosa was performed.     Smith Prep Score: Right Colon 1 Transverse colon 2 Left colon 2    FINDINGS/THERAPEUTICS:    TERMINAL ILEUM: Normal terminal ileum.  Biopsies taken with cold forceps for histology.    COLON: Largely normal right colon mucosa despite fair preparation with adherent yellow stool.  Biopsies taken with cold forceps for histology.  There was 1 right sided colonic ulcer in the transverse colon which was biopsied with cold forceps for histology.  The descending colon and sigmoid colon to 40 cm was unremarkable other than a sigmoid colon ulcer seen at 40 cm.  Both the descending colon and the sigmoid colon ulcer were biopsied with cold forceps for histology.  Melchor 3 colitis seen in the rectosigmoid region  starting at 35 cm with marked erythema, friability and ulceration.  Rectosigmoid and rectal biopsies taken with cold forceps for histology  RECTUM: Internal hemorrhoids seen on forward view.    RECOMMENDATIONS:   Post Colonoscopy/polypectomy precautions, watch for bleeding, infection, perforation, adverse drug reactions   Follow biopsies.  Will discuss with patient about admission for IV steroids and likely IV Remicade versus trial of outpatient steroids    Delbert Valencia MD  5/9/2025  11:01 AM

## 2025-05-09 NOTE — H&P
[facility]                                                               History & Physical         Ameya Kelly Patient Status:  Hospital Outpatient Surgery    1965 MRN [id number]   Location [facility] Attending Delbert Valencia MD   Hosp Day # 0 PCP Shruthi Downey MD     Chief Complaint: Ulcerative colitis flareup with intractable diarrhea    History of Present Illness:  Ameya Kelly is a 59 year old male admitted with ulcerative colitis flareup with intractable diarrhea.  Patient had an elective colonoscopy by GI Dr. Valencia today which showed ulcerative colitis flareup with colon ulcers and was admitted directly after colonoscopy by GI for treatment of the ulcerative colitis flareup with IV steroids and Remicade.  Patient denies any abdominal pain at this time.  No nausea vomiting.  Denies any chest pain or shortness of breath.  Has had intractable diarrhea at home for which had GI workup according to patient.      History:  Past Medical History:    Anesthesia complication    started twitching during sigmoidoscopy when lidocaine and propofol administered    Cellulitis    diagnosed 3 weeks ago, pt reports in between butt cheeks    Diarrhea, unspecified    Internal hemorrhoids    Irregular bowel habits    Perianal abscess    Ulcerative colitis (HCC)    Uncomfortable fullness after meals    Visual impairment    Wears glasses       Past Surgical History:   Procedure Laterality Date    Colonoscopy  2023    Colonoscopy      Other surgical history  2000    B knee scopes    Sigmoidoscopy,diagnostic         Family history:  Family History   Problem Relation Age of Onset    Heart Disorder Father         Valvular heart disease    Cancer Mother         pancreatic CA    Other (Healthy) Brother     Other (Hep C) Brother     Other (Healthy) Brother       Reviewed    Social history:   reports that he has quit smoking. His smoking use included cigarettes. He has been  exposed to tobacco smoke. He has never used smokeless tobacco. He reports current alcohol use of about 6.0 - 12.0 standard drinks of alcohol per week. He reports that he does not use drugs.    Allergies:  Allergies   Allergen Reactions    Lidocaine OTHER (SEE COMMENTS)     Twitching     Propofol OTHER (SEE COMMENTS)     twitching       Home Medications:  Facility-Administered Medications Prior to Admission   Medication Dose Route Frequency Provider Last Rate Last Admin    [COMPLETED] Risankizumab-rzaa (Skyrizi) 1,200 mg in dextrose 5% 270 mL infusion  1,200 mg Intravenous Once Delbert Valencia MD   Stopped at 02/17/25 1005     Medications Prior to Admission   Medication Sig Dispense Refill Last Dose/Taking    PEG 3350-KCl-Na Bicarb-NaCl 420 g Oral Recon Soln Take 4,000 mL by mouth As Directed. 4000 mL 0 5/9/2025 Morning    Sildenafil Citrate (VIAGRA) 100 MG Oral Tab Take 1 tablet (100 mg total) by mouth daily as needed for Erectile Dysfunction. 30 tablet 1 Past Week    Risankizumab-rzaa (SKYRIZI) 360 MG/2.4ML Subcutaneous Solution Cartridge On week 12 inject 360mg subcutaneous and then every 8 weeks thereafter 1 each 5 More than a month    Risankizumab-rzaa (SKYRIZI IV) Inject into the vein. (Patient not taking: Reported on 5/6/2025)   Not Taking       Review of Systems:  A comprehensive 14 point review of systems was completed.  Pertinent positives and negatives noted in the the HPI.    Physical Exam:     Vital signs: Blood pressure 101/53, pulse 64, temperature 98.5 °F (36.9 °C), temperature source Temporal, resp. rate 20, height 5' 9\" (1.753 m), weight 165 lb (74.8 kg), SpO2 100%.  General: No acute distress.   HEENT: Moist mucous membranes.   Respiratory: Clear to auscultation bilaterally.  No wheezes. No rhonchi.  Cardiovascular: S1, S2.  Regular rate and rhythm.  Abdomen: Soft, nontender, nondistended.  Positive bowel sounds.   Neurologic: Awake alert, no focal neurological deficits.  Musculoskeletal: Full  range of motion of all extremities.  No pedal edema or calf tenderness  Psychiatric: Appropriate mood and affect.      Diagnostic Data:      Laboratory Data: Will order CBC and BMP         ASSESSMENT / PLAN:     Inflammatory bowel disease- ulcerative colitis exacerbation with colon ulcer and intractable diarrhea, patient states he has had history of prior perianal abscess and fistula and Crohn's disease also  Seen by GI Dr. Valencia  Status post colonoscopy done today which showed largely normal right colon mucosa despite fair prep with adherent yellow stool.  1 right-sided colonic ulcer in the transverse colon which was biopsied.  Descending colon and sigmoid colon unremarkable other than sigmoid colon ulcer at 40 cm.  Both descending colon and sigmoid colon ulcer biopsy by GI.  Durant 3 colitis seen in rectal sigmoid region starting 35 cm with marked erythema, friability and ulceration.  Follow-up biopsy results  Advance soft diet as tolerated  IV steroids and IV Remicade per GI      Quality:  DVT Prophylaxis: DVT Mechanical Prophylaxis:   SCDs,    DVT Pharmacologic Prophylaxis   Medication    enoxaparin (Lovenox) 40 MG/0.4ML SUBQ injection 40 mg              CODE status:   Code Status: Not on file  Hayes: No urinary catheter in place      Plan of care discussed with patient      Discussed with ER Physician.  Discussed with RN.  Discussed with GI team regarding the direct admit after colonoscopy      Betty Orantes MD  5/9/2025  12:23 PM

## 2025-05-09 NOTE — H&P
[facility]  History & Physical    Ameya Kelly Patient Status:  Hospital Outpatient Surgery    1965 MRN [id number]   Location [facility] Attending Delbert Valencia MD   Hosp Day # 0 PCP Shruthi Downey MD     History of Present Illness:  Ameya Kelly is a(n) 59 year old male.  Patient with a history of ulcerative colitis versus Crohn's disease presenting for colonoscopy.    History:  Past Medical History[1]  Past Surgical History[2]  Family History[3]   reports that he has quit smoking. His smoking use included cigarettes. He has been exposed to tobacco smoke. He has never used smokeless tobacco. He reports current alcohol use of about 6.0 - 12.0 standard drinks of alcohol per week. He reports that he does not use drugs.  Allergies[4]    Current Medications[5]  Medications - Current[6]    Review of Systems:  Gastrointestinal: Denies positive test for blood stool, heartburn/indigestion/reflux, belching, difficulty swallowing, irregular bowel habits, painful swallowing, diarrhea, abdominal pain, constipation, nausea, incontinence of stool, vomiting, black stools, get full quickly at meals, blood in stools, abdominal distention, jaundice, flatulence, vomiting blood, bloating, hernia, laxative use, food/milk intolerance, pain with bowel movement, hemorrhoids.  General: Denies fatigue, chills/fever, night sweats, weight loss, loss of appetite, weight gain, sleep disturbance.  Neurological: Denies frequent headaches, history of stroke, recent passing out, recent dizziness, convulsions/seizures, dementia.  Cardiovascular: Denies history of heart murmur, leg swelling, history of rheumatic fever, pacemaker, chest pain or pressure after eating or when upset, automatic defibrillator, angina, other implanted devices, irregular heart rate/palpitations, high cholesterol or triglycerides, coronary stents.  Respiratory: Denies shortness of breath, chronic/frequent hoarseness,  wheezing, exposure to tuberculosis, chronic cough, spitting up blood, cough up sputum, sleep apnea.  Genitourinary: Denies kidney stones, painful/difficult urination, frequent urinary infections, frequent urination, blood in urine, incontinence, kidney failure, prostate problems.  Endocrine: Denies thyroid disease, denies diabetes.  Female complaints: Denies endometriosis, painful menstrual periods, heavy menstrual periods.  Patient is not pregnant.  Psychosocial: Denies history of mental illness, denies usually feeling lonely or depressed, denies history of depression, anxiety, history of physical or sexual abuse, stress, history of eating disorder.  Skin: Denies severe itching, unusual moles, rash, flushing, change in hair or nails.  Bone/joint: Denies arthritis, back pain, joint pain, osteoporosis.  Heme/Lymphatic: Denies easy bruising, anemia, excessive bleeding, enlarging or painful lymph nodes.  Allergy: Denies medication allergy, latex/rubber allergy, anaphylactic or other reaction to anesthesia, food allergy.   Eyes: Denies blurred/double vision, eye disease, glasses or contacts, glaucoma.  ENT: Denies nose or gums bleeding, mouth sores, bad breath or bad taste in mouth, hearing loss.    Physical Exam:   General: alert, cooperative, oriented.  No respiratory distress.   Head: Normocephalic, without obvious abnormality, atraumatic.   Eyes: Conjunctivae/corneas clear.  No scleral icterus.  No conjunctival     hemorrhage.   Nose: Nares normal.   Throat: Lips, mucosa, and tongue normal.  No thrush noted.   Neck: Soft, supple neck; trachea midline, no adenopathy, no thyromegaly.   Chest wall: No tenderness or deformity.   Heart: Regular rate and rhythm, normal S1S2, no murmur.   Abdomen: soft, non-tender, non-distended, no masses, no guarding, no     rebound, positive BS.   Extremity: no edema, no cyanosis   Skin: No rashes or lesions.    Neurological: Alert, interactive, no focal deficits    ASA Score: 3-Patient  with severe systemic disease    Plan: Colonoscopy  Risk/Benefits:  The risks and benefits of the procedure were discussed in detail with the patient, including the risk of bleeding, infection, pain, sedation, and perforation.  The patient was also informed that polyps and tumors can be missed on rare occasions, which may require future procedures. The patient indicates understanding of these issues and agrees to proceed with the scheduled procedure.   Delbert Valencia MD  5/9/2025  8:10 AM             [1]   Past Medical History:   Anesthesia complication    started twitching during sigmoidoscopy when lidocaine and propofol administered    Cellulitis    diagnosed 3 weeks ago, pt reports in between butt cheeks    Diarrhea, unspecified    Internal hemorrhoids    Irregular bowel habits    Perianal abscess    Ulcerative colitis (HCC)    Uncomfortable fullness after meals    Visual impairment    Wears glasses   [2]   Past Surgical History:  Procedure Laterality Date    Colonoscopy  02/2023    Colonoscopy      Other surgical history  01/01/2000    B knee scopes    Sigmoidoscopy,diagnostic     [3]   Family History  Problem Relation Age of Onset    Heart Disorder Father         Valvular heart disease    Cancer Mother         pancreatic CA    Other (Healthy) Brother     Other (Hep C) Brother     Other (Healthy) Brother    [4]   Allergies  Allergen Reactions    Lidocaine OTHER (SEE COMMENTS)     Twitching     Propofol OTHER (SEE COMMENTS)     twitching   [5]   No current facility-administered medications on file as of .   [6]   Current Outpatient Medications:     Risankizumab-rzaa (SKYRIZI) 360 MG/2.4ML Subcutaneous Solution Cartridge, On week 12 inject 360mg subcutaneous and then every 8 weeks thereafter, Disp: 1 each, Rfl: 5    Sildenafil Citrate (VIAGRA) 100 MG Oral Tab, Take 1 tablet (100 mg total) by mouth daily as needed for Erectile Dysfunction., Disp: 30 tablet, Rfl: 1    PEG 3350-KCl-Na Bicarb-NaCl 420 g Oral Recon  Soln, Take 4,000 mL by mouth As Directed., Disp: 4000 mL, Rfl: 0    Risankizumab-rzaa (SKYRIZI IV), Inject into the vein. (Patient not taking: Reported on 5/6/2025), Disp: , Rfl:

## 2025-05-10 ENCOUNTER — APPOINTMENT (OUTPATIENT)
Dept: MRI IMAGING | Facility: HOSPITAL | Age: 60
End: 2025-05-10
Attending: INTERNAL MEDICINE
Payer: COMMERCIAL

## 2025-05-10 PROBLEM — K50.913 CROHN'S DISEASE WITH FISTULA (HCC): Status: ACTIVE | Noted: 2025-05-10

## 2025-05-10 PROBLEM — K60.30 ANAL FISTULA: Status: ACTIVE | Noted: 2025-05-10

## 2025-05-10 PROBLEM — K60.30 PERIANAL FISTULA: Status: ACTIVE | Noted: 2025-05-10

## 2025-05-10 LAB
ANION GAP SERPL CALC-SCNC: 5 MMOL/L (ref 0–18)
BASOPHILS # BLD AUTO: 0.02 X10(3) UL (ref 0–0.2)
BASOPHILS NFR BLD AUTO: 0.3 %
BUN BLD-MCNC: 9 MG/DL (ref 9–23)
C DIFF TOX B STL QL: NEGATIVE
CALCIUM BLD-MCNC: 9.1 MG/DL (ref 8.7–10.6)
CHLORIDE SERPL-SCNC: 108 MMOL/L (ref 98–112)
CO2 SERPL-SCNC: 27 MMOL/L (ref 21–32)
CREAT BLD-MCNC: 0.8 MG/DL (ref 0.7–1.3)
EGFRCR SERPLBLD CKD-EPI 2021: 102 ML/MIN/1.73M2 (ref 60–?)
EOSINOPHIL # BLD AUTO: 0 X10(3) UL (ref 0–0.7)
EOSINOPHIL NFR BLD AUTO: 0 %
ERYTHROCYTE [DISTWIDTH] IN BLOOD BY AUTOMATED COUNT: 13.1 %
GLUCOSE BLD-MCNC: 169 MG/DL (ref 70–99)
HCT VFR BLD AUTO: 43.4 % (ref 39–53)
HGB BLD-MCNC: 14.7 G/DL (ref 13–17.5)
IMM GRANULOCYTES # BLD AUTO: 0.03 X10(3) UL (ref 0–1)
IMM GRANULOCYTES NFR BLD: 0.4 %
LYMPHOCYTES # BLD AUTO: 0.97 X10(3) UL (ref 1–4)
LYMPHOCYTES NFR BLD AUTO: 13.8 %
MAGNESIUM SERPL-MCNC: 2 MG/DL (ref 1.6–2.6)
MCH RBC QN AUTO: 30.6 PG (ref 26–34)
MCHC RBC AUTO-ENTMCNC: 33.9 G/DL (ref 31–37)
MCV RBC AUTO: 90.4 FL (ref 80–100)
MONOCYTES # BLD AUTO: 0.39 X10(3) UL (ref 0.1–1)
MONOCYTES NFR BLD AUTO: 5.6 %
NEUTROPHILS # BLD AUTO: 5.61 X10 (3) UL (ref 1.5–7.7)
NEUTROPHILS # BLD AUTO: 5.61 X10(3) UL (ref 1.5–7.7)
NEUTROPHILS NFR BLD AUTO: 79.9 %
OSMOLALITY SERPL CALC.SUM OF ELEC: 293 MOSM/KG (ref 275–295)
PLATELET # BLD AUTO: 275 10(3)UL (ref 150–450)
POTASSIUM SERPL-SCNC: 4.4 MMOL/L (ref 3.5–5.1)
RBC # BLD AUTO: 4.8 X10(6)UL (ref 4.3–5.7)
SODIUM SERPL-SCNC: 140 MMOL/L (ref 136–145)
WBC # BLD AUTO: 7 X10(3) UL (ref 4–11)

## 2025-05-10 PROCEDURE — 72197 MRI PELVIS W/O & W/DYE: CPT | Performed by: INTERNAL MEDICINE

## 2025-05-10 PROCEDURE — 99232 SBSQ HOSP IP/OBS MODERATE 35: CPT | Performed by: INTERNAL MEDICINE

## 2025-05-10 RX ORDER — GADOTERATE MEGLUMINE 376.9 MG/ML
15 INJECTION INTRAVENOUS
Status: COMPLETED | OUTPATIENT
Start: 2025-05-10 | End: 2025-05-10

## 2025-05-10 NOTE — PROGRESS NOTES
[facility]   part of [facility]     Hospitalist Progress Note     Ameya Kelly Patient Status:  Observation    1965 MRN [id number]   Location [facility] 0SW-A Attending Delbert Valencia MD   Hosp Day # 0 PCP Shruthi Downey MD     Chief Complaint: Ulcerative colitis flareup with intractable diarrhea    Subjective:     Patient seen with patient's wife at bedside.  Diarrhea improving, had 2 episodes today and getting more formed according to patient.  Denies abdominal pain.    Objective:    Review of Systems:   A comprehensive review of systems was completed; pertinent positive and negatives stated in subjective.    Vital signs:  Temp:  [97.2 °F (36.2 °C)-98.3 °F (36.8 °C)] 97.2 °F (36.2 °C)  Pulse:  [54-67] 54  Resp:  [17-18] 17  BP: (118-121)/(73-80) 118/73  SpO2:  [97 %-98 %] 97 %    Physical Exam:    General: No acute distress  Respiratory: No wheezes, no rhonchi  Cardiovascular: S1, S2, regular rate and rhythm  Abdomen: Soft, Non-tender, non-distended, positive bowel sounds  Neuro: No new focal deficits.   Extremities: No edema      Diagnostic Data:    Labs:  Recent Labs   Lab 25  1451 05/10/25  0658   WBC 5.7 7.0   HGB 14.7 14.7   MCV 88.8 90.4   .0 275.0       Recent Labs   Lab 25  1451 05/10/25  0658   GLU 78 169*   BUN 7* 9   CREATSERUM 0.74 0.80   CA 9.2 9.1   ALB 4.3  --     140   K 3.6 4.4    108   CO2 28.0 27.0   ALKPHO 58  --    AST 26  --    ALT 37  --    BILT 0.6  --    TP 6.8  --        Estimated Glomerular Filtration Rate: 102 mL/min/1.73m2 (result from lab).    No results for input(s): \"TROP\", \"TROPHS\", \"CK\" in the last 168 hours.    No results for input(s): \"PTP\", \"INR\" in the last 168 hours.               Microbiology    No results found for this visit on 25.      Imaging: Reviewed in Epic.    Medications:    enoxaparin  40 mg Subcutaneous Daily    methylPREDNISolone  20 mg Intravenous Q8H       Assessment & Plan:      Inflammatory  bowel disease- ulcerative colitis exacerbation with colon ulcer and intractable diarrhea   history of prior perianal abscess    perianal fistula , possible Crohn's disease   Seen by GI Dr. Valencia  Status post colonoscopy done today which showed largely normal right colon mucosa despite fair prep with adherent yellow stool.  1 right-sided colonic ulcer in the transverse colon which was biopsied.  Descending colon and sigmoid colon unremarkable other than sigmoid colon ulcer at 40 cm.  Both descending colon and sigmoid colon ulcer biopsy by GI.  Melchor 3 colitis seen in rectal sigmoid region starting 35 cm with marked erythema, friability and ulceration.  Follow-up biopsy results which is still pending at this time  Advance soft diet as tolerated  IV steroids on IV Solu-Medrol 20 mg IV every 8 hours per GI  On IV Remicade per GI  MRI pelvis ordered by GI, results pending at this time-addendum-results came back with Right-sided perianal fistula which appears to travel through the most distal aspect of the internal anal   sphincter and is distal to the external anal sphincter.  The fistula does have significant enhancement suggesting active inflammation. Mild wall thickening of the distal rectum can be seen with mild proctitis.      Discussed with patient, patient's wife at bedside    Betty Orantes MD    Supplementary Documentation:     Quality:  DVT Mechanical Prophylaxis:   SCDs,    DVT Pharmacologic Prophylaxis   Medication    enoxaparin (Lovenox) 40 MG/0.4ML SUBQ injection 40 mg                Code Status: Not on file  Hayes: No urinary catheter in place  Hayes Duration (in days):   Central line:    MARK: 5/10/2025    Discharge is dependent on: Clinical progress  At this point Mr. Kelly is expected to be discharge to: Home    The 21st Century Cures Act makes medical notes like these available to patients in the interest of transparency. Please be advised this is a medical document. Medical documents are intended to  carry relevant information, facts as evident, and the clinical opinion of the practitioner. The medical note is intended as peer to peer communication and may appear blunt or direct. It is written in medical language and may contain abbreviations or verbiage that are unfamiliar.

## 2025-05-10 NOTE — PLAN OF CARE
Plan for dc Monday  MRI pending pelvis  IV steroids  Per patient stools firming up and \"fluffy\" at this time  Cdiff neg  Isolation discontinued   IVF

## 2025-05-10 NOTE — PROGRESS NOTES
[facility]  GASTROENTEROLOGY PROGRESS NOTE   [facility] Gastroenterology     Ameya eKlly Patient Status:  Observation    1965 MRN [id number]   Location [facility] 0SW-A Attending Delbert Valencia MD   Hosp Day # 0 PCP Shruthi Downey MD       Reason for Consultation:   Inflammatory bowel disease  Admitted after outpatient colonoscopy showed severe colitis  History of anal fistula    Subjective:   Patient is doing well today.  He is currently on IV steroids.  He did obtain a dose of Remicade yesterday.  His stools are having more bulk although still semisolid.  No abdominal pain.  No overt GI bleeding.  His appetite is good.    Problem List[1]    PMH, PSH, Fhx, Shx as per initial consult note    Review of Systems    12 point Review of Systems negative unless otherwise mentioned in Subjective.     Physical Exam  /73 (BP Location: Right arm)   Pulse 54   Temp 97.2 °F (36.2 °C) (Oral)   Resp 17   Ht 5' 9\" (1.753 m)   Wt 165 lb (74.8 kg)   SpO2 97%   BMI 24.37 kg/m²   Body mass index is 24.37 kg/m².      Gen: No acute distress  Resp: no respiratory distress  Abd: Soft, non-tender, non-distended. No rebound tenderness, no guarding.   Neuro: Aox3.     Diagnostic Data:      Labs:  Recent Labs   Lab 25  1451 05/10/25  0658   WBC 5.7 7.0   HGB 14.7 14.7   MCV 88.8 90.4   .0 275.0       Recent Labs   Lab 25  1451 05/10/25  0658   GLU 78 169*   BUN 7* 9   CREATSERUM 0.74 0.80   CA 9.2 9.1   ALB 4.3  --     140   K 3.6 4.4    108   CO2 28.0 27.0   ALKPHO 58  --    AST 26  --    ALT 37  --    BILT 0.6  --    TP 6.8  --        No results for input(s): \"PTP\", \"INR\" in the last 168 hours.    No data recorded        Imaging: Imaging data reviewed in Epic.    Medications: Scheduled Medications[2]    Allergies:  Allergies[3]    Imaging:  I have personally reviewed all pertinent available imaging.       ASSESSMENT / PLAN:     Ameya Kelly is a 59 year old male with a  history of inflammatory bowel disease, severe flare of ulcerative colitis although given presence of anal fistula, possibility of Crohn's disease has been raised.    Clinically improving on IV steroids.  IV Remicade first dose May 9, 2025.  C. difficile negative      Recommendations:   Continue IV Solu-Medrol 20 mg IV every 8 hours  Follow-up pathology results from colonoscopy  S/p IV Remicade this admission  IVF, analgesia, anti-emetics per primary team  DVT ppx   MR pelvis      Medhat Oakley MD  [facility]               [1]   Patient Active Problem List  Diagnosis    Erectile dysfunction    Hypercholesterolemia    Angiodysplasia of colon without hemorrhage    Ulcerative (chronic) proctitis (HCC)    Osteopenia of multiple sites    Bilateral chronic knee pain    Chronic ulcerative pancolitis, with rectal bleeding (HCC)    Exacerbation of ulcerative colitis with complication (HCC)    Diarrhea   [2]    enoxaparin  40 mg Subcutaneous Daily    methylPREDNISolone  20 mg Intravenous Q8H   [3]   Allergies  Allergen Reactions    Lidocaine OTHER (SEE COMMENTS)     Twitching     Propofol OTHER (SEE COMMENTS)     twitching      Satisfactory

## 2025-05-10 NOTE — PLAN OF CARE
Received patient on bed alert and oriented x4. Afebrile. No shortness of breath noted, on room air. Denies any pain and discomforts. Diet tolerated. No nausea and vomiting noted. Scheduled meds given per MAR, tolerated. Call lights within reach. Safety precautions in place, All needs attended. Continue monitor.

## 2025-05-11 VITALS
HEIGHT: 69 IN | WEIGHT: 165 LBS | BODY MASS INDEX: 24.44 KG/M2 | SYSTOLIC BLOOD PRESSURE: 104 MMHG | DIASTOLIC BLOOD PRESSURE: 64 MMHG | OXYGEN SATURATION: 96 % | HEART RATE: 56 BPM | TEMPERATURE: 98 F | RESPIRATION RATE: 18 BRPM

## 2025-05-11 LAB
HAV IGM SER QL: NONREACTIVE
HBV CORE AB SERPL QL IA: NONREACTIVE
HBV CORE IGM SER QL: NONREACTIVE
HBV SURFACE AG SERPL QL IA: NONREACTIVE
HCV AB SERPL QL IA: NONREACTIVE

## 2025-05-11 PROCEDURE — 99239 HOSP IP/OBS DSCHRG MGMT >30: CPT | Performed by: INTERNAL MEDICINE

## 2025-05-11 RX ORDER — PREDNISONE 20 MG/1
40 TABLET ORAL
Status: DISCONTINUED | OUTPATIENT
Start: 2025-05-12 | End: 2025-05-11

## 2025-05-11 NOTE — PROGRESS NOTES
[facility]  GASTROENTEROLOGY PROGRESS NOTE   [facility]     Ameya Kelly Patient Status:  Observation    1965 MRN [id number]   Location [facility] 0SW-A Attending Delbert Valencia MD   Hosp Day # 2 PCP Shruthi Downey MD       Reason for Consultation:   Inflammatory bowel disease  Admitted after outpatient colonoscopy showed severe colitis  History of anal fistula    Subjective:   Pt states he feels \"great\" this morning.   Denies any n/v. No abdominal pain, no overt GI bleeding.   Formed stool now. No diarrhea.   MRI completed     Problem List[1]    PMH, PSH, Fhx, Shx as per initial consult note    Review of Systems    12 point Review of Systems negative unless otherwise mentioned in Subjective.     Physical Exam  /64 (BP Location: Right arm)   Pulse 56   Temp 97.7 °F (36.5 °C) (Oral)   Resp 18   Ht 5' 9\" (1.753 m)   Wt 165 lb (74.8 kg)   SpO2 96%   BMI 24.37 kg/m²   Body mass index is 24.37 kg/m².      Gen: No acute distress  Resp: no respiratory distress  Abd: Soft, non-tender, non-distended. No rebound tenderness, no guarding.   Neuro: Aox3.     Diagnostic Data:      Labs:  Recent Labs   Lab 25  1451 05/10/25  0658   WBC 5.7 7.0   HGB 14.7 14.7   MCV 88.8 90.4   .0 275.0       Recent Labs   Lab 25  1451 05/10/25  0658   GLU 78 169*   BUN 7* 9   CREATSERUM 0.74 0.80   CA 9.2 9.1   ALB 4.3  --     140   K 3.6 4.4    108   CO2 28.0 27.0   ALKPHO 58  --    AST 26  --    ALT 37  --    BILT 0.6  --    TP 6.8  --        No results for input(s): \"PTP\", \"INR\" in the last 168 hours.    No data recorded        Imaging: Imaging data reviewed in Epic.    Medications: Scheduled Medications[2]    Allergies:  Allergies[3]    Imaging:  I have personally reviewed all pertinent available imaging.         Narrative   PROCEDURE:  MRI PELVIS (SOFT TISSUE) (W+WO) (CPT=72197)     COMPARISON:  [facility], CT, CT ABDOMEN+PELVIS(CONTRAST ONLY)(CPT=74177),  5/01/2025, 2:27 PM.     INDICATIONS:  Crohn's flare with anal fistula.     TECHNIQUE:  Multiplanar T1 and T2 images of the pelvis are acquired without infusion, followed by multiplanar post infusion scans are performed after paramagnetic contrast material.     PATIENT STATED HISTORY:(As transcribed by Technologist)  Patient has history of Crohn's with an anal fistula.      CONTRAST USED:  15 mL of Dotarem     FINDINGS:       There is T2 centrally hyperintense and peripherally hypointense and T1 hypointense fistula track extending from the right medial gluteal fold superiorly towards the anus and rectum extending through the distal aspect of the internal anal sphincter just  distal to the external anal sphincter.  The caudal cranial length of the relatively straight line fistula is 2.4 cm. Post-contrast enhancement demonstrates avid enhancement along the fistulous track which is seen to extend to the internal anal sphincter  at the 11 o'clock position image number 42 of series 11. There is no drainable fluid collection or abscess.     The rectal wall demonstrates mild diffuse thickening involving the distal rectum which can be seen with mild proctitis.  This is not safely change since 5/1/2025 CT.  There is no significant inflammatory change within the mesorectal fossa.  There is no  evidence of adenopathy or abscess within the pelvis.                   Impression   CONCLUSION:       1. Right-sided perianal fistula which appears to travel through the most distal aspect of the internal anal  sphincter and is distal to the external anal sphincter.  The fistula does have significant enhancement suggesting active inflammation.     2. Mild wall thickening of the distal rectum can be seen with mild proctitis.        LOCATION:  [facility]        Dictated by (CST): Nolan Dobbs MD on 5/10/2025 at 2:23 PM      Finalized by (CST): Nolan oDbbs MD on 5/10/2025 at 2:41 PM         ASSESSMENT / PLAN:     Ameya Kelly is a 59 year  old male with a history of inflammatory bowel disease, severe flare of ulcerative colitis although given presence of anal fistula, possibility of Crohn's disease has been raised.    Clinically improving on IV steroids.  IV Remicade first dose May 9, 2025.  Clinically asymptomatic today.   Reviewed MRI - fistula perianal. Pt scheduled with surgery as OP 5/22/25; pt has tertiary care IBD surgery appt scheduled in June 2025 - [facility]      Recommendations:   IV solumedrol -> transition to PO prednisone 40mg once daily ; discharge on prednisone taper as below  Follow-up pathology results from colonoscopy  S/p IV Remicade this admission  F/u with OP established GI, Dr Valencia in 1-2 weeks  IVF, analgesia, anti-emetics per primary team  DVT ppx   Keep OP general surgery appt for fistula - scheduled 5/22/25 and tertiary care as above    GI will signoff, please page with any questions.     PREDNISONE TAPER  - Prednisone 40mg daily, will discharge on prednisone taper w/    -Prednisone 40mg x 1 week, then decrease to,    -Prednisone 30mg x 1 week, then decrease to,    -Prednisone 20mg x 1 week, then decrease to,    -Prednisone 15mg x 1 week, then decrease to,    -Prednisone 10mg x 1 week, then decrease to,    -Prednisone 5mg x 1 week.     Medhat Oakley MD  [facility]               [1]   Patient Active Problem List  Diagnosis    Erectile dysfunction    Hypercholesterolemia    Angiodysplasia of colon without hemorrhage    Ulcerative (chronic) proctitis (HCC)    Osteopenia of multiple sites    Bilateral chronic knee pain    Chronic ulcerative pancolitis, with rectal bleeding (HCC)    Exacerbation of ulcerative colitis with complication (HCC)    Diarrhea    Perianal fistula    Crohn's disease with fistula (HCC)    Anal fistula   [2]    enoxaparin  40 mg Subcutaneous Daily    methylPREDNISolone  20 mg Intravenous Q8H   [3]   Allergies  Allergen Reactions    Lidocaine OTHER (SEE COMMENTS)     Twitching     Propofol OTHER  (SEE COMMENTS)     twitching

## 2025-05-11 NOTE — PLAN OF CARE
Patient is:  A&Ox4. VSS. RA.   GI: Abdomen soft, no reports of pain. He reports that he has had 3-4 BMs today as opposed to the 9-15/day he was having before.  : Up to the restroom for voids.  Up independently.   Diet: Soft low fiber  IVF running per order. IV solumedrol per order.  Patient reports that he is hoping for discharge today and he does not feel the need to be hospitalized and is doing better. He wants to discuss discharge with Dr. Oakley.  All appropriate safety measures in place. All questions and concerns addressed to the best of my ability

## 2025-05-11 NOTE — PROGRESS NOTES
[facility]   part of [facility]     Hospitalist Progress Note     Ameya Kelly Patient Status:  Observation    1965 MRN [id number]   Location [facility] 0SW-A Attending Delbert Valencia MD   Hosp Day # 2 PCP Shruthi Downey MD     Chief Complaint: Ulcerative colitis flareup with intractable diarrhea    Subjective:     Patient feeling better, had 1 episode of formed stool today morning according to patient.  Tolerating diet.  Denies abdominal pain.  Eager to go home.  Cleared by GI for discharge today    Objective:    Review of Systems:   A comprehensive review of systems was completed; pertinent positive and negatives stated in subjective.    Vital signs:  Temp:  [97.4 °F (36.3 °C)-97.7 °F (36.5 °C)] 97.7 °F (36.5 °C)  Pulse:  [56-68] 56  Resp:  [18] 18  BP: (104-120)/(64-66) 104/64  SpO2:  [92 %-96 %] 96 %    Physical Exam:    General: No acute distress  Respiratory: No wheezes, no rhonchi  Cardiovascular: S1, S2, regular rate and rhythm  Abdomen: Soft, Non-tender, non-distended, positive bowel sounds  Neuro: No new focal deficits.   Extremities: No edema      Diagnostic Data:    Labs:  Recent Labs   Lab 25  1451 05/10/25  0658   WBC 5.7 7.0   HGB 14.7 14.7   MCV 88.8 90.4   .0 275.0       Recent Labs   Lab 25  1451 05/10/25  0658   GLU 78 169*   BUN 7* 9   CREATSERUM 0.74 0.80   CA 9.2 9.1   ALB 4.3  --     140   K 3.6 4.4    108   CO2 28.0 27.0   ALKPHO 58  --    AST 26  --    ALT 37  --    BILT 0.6  --    TP 6.8  --        Estimated Glomerular Filtration Rate: 102 mL/min/1.73m2 (result from lab).    No results for input(s): \"TROP\", \"TROPHS\", \"CK\" in the last 168 hours.    No results for input(s): \"PTP\", \"INR\" in the last 168 hours.               Microbiology    No results found for this visit on 25.      Imaging: Reviewed in Epic.    Medications:    [START ON 2025] predniSONE  40 mg Oral Daily with breakfast    enoxaparin  40 mg Subcutaneous  Daily       Assessment & Plan:      Inflammatory bowel disease- ulcerative colitis exacerbation with colon ulcer and intractable diarrhea   history of prior perianal abscess    perianal fistula , possible Crohn's disease   Seen by GI Dr. Valencia  Status post colonoscopy done today which showed largely normal right colon mucosa despite fair prep with adherent yellow stool.  1 right-sided colonic ulcer in the transverse colon which was biopsied.  Descending colon and sigmoid colon unremarkable other than sigmoid colon ulcer at 40 cm.  Both descending colon and sigmoid colon ulcer biopsy by GI.  Melchor 3 colitis seen in rectal sigmoid region starting 35 cm with marked erythema, friability and ulceration.  Follow-up biopsy results which is still pending at this time  Advance soft diet as tolerated  Status post IV steroids on IV Solu-Medrol 20 mg IV every 8 hours per GI  On IV Remicade per GI  MRI pelvis ordered by GI, results pending at this time-addendum-results came back with Right-sided perianal fistula which appears to travel through the most distal aspect of the internal anal   sphincter and is distal to the external anal sphincter.  The fistula does have significant enhancement suggesting active inflammation. Mild wall thickening of the distal rectum can be seen with mild proctitis.      Discussed with patient, patient's wife at bedside    DC home today, cleared by GI.  GI medications at the time of discharge per GI.  Follow-up with GI as outpatient as directed.  Follow-up with regular outpatient primary care patient within 1 week in office.    Betty Orantes MD    Supplementary Documentation:     Quality:  DVT Mechanical Prophylaxis:   SCDs,    DVT Pharmacologic Prophylaxis   Medication    enoxaparin (Lovenox) 40 MG/0.4ML SUBQ injection 40 mg                Code Status: Not on file  Hayes: No urinary catheter in place  Hayes Duration (in days):   Central line:    MARK: 5/11/2025    Discharge is dependent on:  Clinical progress  At this point Mr. Kelly is expected to be discharge to: Home    The 21st Century Cures Act makes medical notes like these available to patients in the interest of transparency. Please be advised this is a medical document. Medical documents are intended to carry relevant information, facts as evident, and the clinical opinion of the practitioner. The medical note is intended as peer to peer communication and may appear blunt or direct. It is written in medical language and may contain abbreviations or verbiage that are unfamiliar.

## 2025-05-11 NOTE — DISCHARGE SUMMARY
[facility]  Discharge Summary    Ameya Kelly Patient Status:  Inpatient    1965 MRN [id number]   Location [facility] 3NW-A Attending No att. providers found   Hosp Day # 2 PCP Shruthi Downey MD     Date of Admission: 2025    Date of Discharge: 2025     Disposition: Home or Self Care    Discharge Diagnosis:   Inflammatory bowel disease- ulcerative colitis exacerbation with colon ulcer and intractable diarrhea   history of prior perianal abscess    perianal fistula , possible Crohn's disease     Chief Complaint: Ulcerative colitis flareup with intractable diarrhea     History of Present Illness:   Ameya Kelly is a 59 year old male admitted with ulcerative colitis flareup with intractable diarrhea.  Patient had an elective colonoscopy by GI Dr. Valencia today which showed ulcerative colitis flareup with colon ulcers and was admitted directly after colonoscopy by GI for treatment of the ulcerative colitis flareup with IV steroids and Remicade.  Patient denies any abdominal pain at this time.  No nausea vomiting.  Denies any chest pain or shortness of breath.  Has had intractable diarrhea at home for which had GI workup according to patient.       Brief Synopsis:   Inflammatory bowel disease- ulcerative colitis exacerbation with colon ulcer and intractable diarrhea   history of prior perianal abscess    perianal fistula , possible Crohn's disease   Seen by GI Dr. Valencia  Status post colonoscopy done on day of admission just prior to admission showed largely normal right colon mucosa despite fair prep with adherent yellow stool.  1 right-sided colonic ulcer in the transverse colon which was biopsied.  Descending colon and sigmoid colon unremarkable other than sigmoid colon ulcer at 40 cm.  Both descending colon and sigmoid colon ulcer biopsy by GI.  Melchor 3 colitis seen in rectal sigmoid region starting 35 cm with marked erythema, friability and ulceration.  Follow-up biopsy results  which is still pending at this time  Advance soft diet as tolerated  Treated with IV steroids on IV Solu-Medrol 20 mg IV every 8 hours per GI  Treated on IV Remicade per GI  MRI pelvis ordered by GI, came back with Right-sided perianal fistula which appears to travel through the most distal aspect of the internal anal   sphincter and is distal to the external anal sphincter.  The fistula does have significant enhancement suggesting active inflammation. Mild wall thickening of the distal rectum can be seen with mild proctitis.   Patient improved clinically.  Diarrhea improved.   Discharge home today in stable condition after cleared by GI.  GI medications at the time of discharge per GI.  Follow-up with GI as outpatient as directed.  Follow-up with regular outpatient primary care physician Shruthi Downey MD within 1 week in office.        TCM Diagnosis at discharge from Hospital: Other: See above; still recommend for TCM follow-up    Lace+ Score: 22  59-90 High Risk  29-58 Medium Risk  0-28   Low Risk.     TCM Follow-Up Recommendation:Ameya Kelly   LACE < 29: Low Risk of readmission after discharge from the hospital; Still recommend for TCM follow-up.      PCP: Shruthi Downey MD    Consultations:   Consultants         Provider   Role Specialty     Delbert Valencia MD      Consulting Physician GASTROENTEROLOGY         Procedures during hospitalization:   None    Incidental or significant findings and recommendations (brief descriptions):  None    Lab/Test results pending at Discharge:   None      Discharge Medications:        Discharge Medications        START taking these medications        Instructions Prescription details   predniSONE 10 MG Tabs  Commonly known as: Deltasone      Take 1 tablet (10 mg total) by mouth daily. PREDNISONE TAPER - Prednisone 40mg daily, will discharge on prednisone taper w/ -Prednisone 40mg x 1 week, then decrease to, -Prednisone 30mg x 1 week, then decrease to, -Prednisone 20mg  x 1 week, then decrease to, -Prednisone 15mg x 1 week, then decrease to, -Prednisone 10mg x 1 week, then decrease to, -Prednisone 5mg x 1 week.   Quantity: 190 tablet  Refills: 0            CONTINUE taking these medications        Instructions Prescription details   Sildenafil Citrate 100 MG Tabs  Commonly known as: Viagra      Take 1 tablet (100 mg total) by mouth daily as needed for Erectile Dysfunction.   Quantity: 30 tablet  Refills: 1            STOP taking these medications      PEG 3350-KCl-Na Bicarb-NaCl 420 g Solr  Commonly known as: NULYTELY        Skyrizi 360 MG/2.4ML Soct  Generic drug: Risankizumab-rzaa        SKYRIZI IV                  Where to Get Your Medications        These medications were sent to [id number] - [place], [place] - [address] [phone], [phone]  [address] [address]      Phone: [phone]   predniSONE 10 MG Tabs          [place] prescription monitoring program data reviewed in epic before prescribing narcotics/controlled substances: Not applicable as no narcotics prescribed    Follow up Visits: Follow-up with Shruthi Downey MD in 1 week    Delbert Valencia MD  [address] [place] [address] [phone]    Call  As needed    Shruthi Downey MD  [address] [id number] [phone]    Schedule an appointment as soon as possible for a visit in 1 week(s)      Appointments for Next 30 Days 5/11/2025 - 6/10/2025        Date Arrival Time Visit Type Length Department Provider     5/22/2025  8:15 AM  EXAM - ESTABLISHED [2844] 15 min [facility],William Christensen MD    Patient Instructions:         Location Instructions:     Masks are optional for all patients and visitors, unless otherwise indicated. Note: A $50 fee will be charged for missed appointments or same-day cancellations. Please provide 24 hours' notice if you need to cancel or reschedule your appointment.                5/29/2025  8:40 AM  FOLLOW-UP OV [05201] 20 min [facility] Delbetr Valencia MD    Patient Instructions:     Please arrive 15 minutes prior to your scheduled appointment time.                           Physical Exam:  /64 (BP Location: Right arm)   Pulse 56   Temp 97.7 °F (36.5 °C) (Oral)   Resp 18   Ht 5' 9\" (1.753 m)   Wt 165 lb (74.8 kg)   SpO2 96%   BMI 24.37 kg/m²   General: No acute distress  Respiratory: No wheezes, no rhonchi  Cardiovascular: S1, S2, regular rate and rhythm  Abdomen: Soft, Non-tender, non-distended, positive bowel sounds  Neuro: No new focal deficits.   Extremities: No edema       Discharge Condition: stable    Patient Discharge Instructions: See electronic chart      More than 30 minutes on discharge    Betty Orantes MD

## 2025-05-12 ENCOUNTER — PATIENT OUTREACH (OUTPATIENT)
Dept: CASE MANAGEMENT | Age: 60
End: 2025-05-12

## 2025-05-14 ENCOUNTER — TELEPHONE (OUTPATIENT)
Dept: INTERNAL MEDICINE CLINIC | Facility: CLINIC | Age: 60
End: 2025-05-14

## 2025-05-14 NOTE — PAYOR COMM NOTE
--------------  ADMISSION REVIEW     Payor: [employer] [facility]/HMO/POS/EPO  Subscriber #:  [id number]  Authorization Number: [id number]    Admit date: 5/9/25  Admit time:  2:02 PM           H & P    Chief Complaint: Ulcerative colitis flareup with intractable diarrhea     History of Present Illness:  Ameya Kelly is a 59 year old male admitted with ulcerative colitis flareup with intractable diarrhea.  Patient had an elective colonoscopy by GI Dr. Valencia today which showed ulcerative colitis flareup with colon ulcers and was admitted directly after colonoscopy by GI for treatment of the ulcerative colitis flareup with IV steroids and Remicade.  Patient denies any abdominal pain at this time.  No nausea vomiting.  Denies any chest pain or shortness of breath.  Has had intractable diarrhea at home for which had GI workup according to patient.        History:  Past Medical History       Past Medical History:    Anesthesia complication     started twitching during sigmoidoscopy when lidocaine and propofol administered    Cellulitis     diagnosed 3 weeks ago, pt reports in between butt cheeks    Diarrhea, unspecified    Internal hemorrhoids    Irregular bowel habits    Perianal abscess    Ulcerative colitis (HCC)    Uncomfortable fullness after meals    Visual impairment    Wears glasses            Past Surgical History         Past Surgical History:   Procedure Laterality Date    Colonoscopy   02/2023    Colonoscopy        Other surgical history   01/01/2000     B knee scopes    Sigmoidoscopy,diagnostic                Family history:  Family History         Family History   Problem Relation Age of Onset    Heart Disorder Father           Valvular heart disease    Cancer Mother           pancreatic CA    Other (Healthy) Brother      Other (Hep C) Brother      Other (Healthy) Brother           Reviewed     Social history:   reports that he has quit smoking. His smoking use included cigarettes. He has been  exposed to tobacco smoke. He has never used smokeless tobacco. He reports current alcohol use of about 6.0 - 12.0 standard drinks of alcohol per week. He reports that he does not use drugs.     Allergies:  Allergies         Allergies   Allergen Reactions    Lidocaine OTHER (SEE COMMENTS)       Twitching     Propofol OTHER (SEE COMMENTS)       twitching            Home Medications:  Prescriptions Prior to Admission             Facility-Administered Medications Prior to Admission   Medication Dose Route Frequency Provider Last Rate Last Admin    [COMPLETED] Risankizumab-rzaa (Skyrizi) 1,200 mg in dextrose 5% 270 mL infusion  1,200 mg Intravenous Once Delbert Valencia MD   Stopped at 02/17/25 1005              Medications Prior to Admission   Medication Sig Dispense Refill Last Dose/Taking    PEG 3350-KCl-Na Bicarb-NaCl 420 g Oral Recon Soln Take 4,000 mL by mouth As Directed. 4000 mL 0 5/9/2025 Morning    Sildenafil Citrate (VIAGRA) 100 MG Oral Tab Take 1 tablet (100 mg total) by mouth daily as needed for Erectile Dysfunction. 30 tablet 1 Past Week    Risankizumab-rzaa (SKYRIZI) 360 MG/2.4ML Subcutaneous Solution Cartridge On week 12 inject 360mg subcutaneous and then every 8 weeks thereafter 1 each 5 More than a month    Risankizumab-rzaa (SKYRIZI IV) Inject into the vein. (Patient not taking: Reported on 5/6/2025)     Not Taking            Review of Systems:  A comprehensive 14 point review of systems was completed.  Pertinent positives and negatives noted in the the HPI.     Physical Exam:      Vital signs: Blood pressure 101/53, pulse 64, temperature 98.5 °F (36.9 °C), temperature source Temporal, resp. rate 20, height 5' 9\" (1.753 m), weight 165 lb (74.8 kg), SpO2 100%.  General: No acute distress.   HEENT: Moist mucous membranes.   Respiratory: Clear to auscultation bilaterally.  No wheezes. No rhonchi.  Cardiovascular: S1, S2.  Regular rate and rhythm.  Abdomen: Soft, nontender, nondistended.  Positive bowel  sounds.   Neurologic: Awake alert, no focal neurological deficits.  Musculoskeletal: Full range of motion of all extremities.  No pedal edema or calf tenderness  Psychiatric: Appropriate mood and affect.        Diagnostic Data:       Laboratory Data: Will order CBC and BMP        ASSESSMENT / PLAN:      Inflammatory bowel disease- ulcerative colitis exacerbation with colon ulcer and intractable diarrhea, patient states he has had history of prior perianal abscess and fistula and Crohn's disease also  Seen by GI Dr. Valencia  Status post colonoscopy done today which showed largely normal right colon mucosa despite fair prep with adherent yellow stool.  1 right-sided colonic ulcer in the transverse colon which was biopsied.  Descending colon and sigmoid colon unremarkable other than sigmoid colon ulcer at 40 cm.  Both descending colon and sigmoid colon ulcer biopsy by GI.  Melchor 3 colitis seen in rectal sigmoid region starting 35 cm with marked erythema, friability and ulceration.  Follow-up biopsy results  Advance soft diet as tolerated  IV steroids and IV Remicade per GI        Quality:  DVT Prophylaxis: DVT Mechanical Prophylaxis:   SCDs,        DVT Pharmacologic Prophylaxis   Medication    enoxaparin (Lovenox) 40 MG/0.4ML SUBQ injection 40 mg               CODE status:   Code Status: Not on file  Hayes: No urinary catheter in place        Plan of care discussed with patient        Discussed with ER Physician.  Discussed with RN.  Discussed with GI team regarding the direct admit after colonoscopy        Betty Orantes MD  5/9/2025  12:23 PM                 Electronically signed by Betty Orantes MD at 5/9/2025  2:38 PM            Operative Report-Colonoscopy     PREOPERATIVE DIAGNOSIS/INDICATION: IBD     POSTOPERTATIVE DIAGNOSIS: Melchor 3 Ulcerative Colitis, colon ulcers      PROCEDURE PERFORMED: COLONOSCOPY     INFORMED CONSENT:  Once a brief history and physical examination was performed, the risks, benefits and  alternatives to the procedure were discussed with the patient and/or family and informed consent was obtained. The risks of sedation, perforation, missed lesions and need for surgery were all discussed.  Patient expressed understanding of the risks and agreed to proceed.        PROCEDURE DESCRIPTION:The patient was then brought to the endoscopy suite where his pulse, pulse oximetry and blood pressure were monitored. Patient was placed in the left lateral decubitus position and deep sedation was administered. Once adequate sedation was achieved, a rectal exam was performed which was normal. A lubricated tip of an Olympus video colonoscope was then inserted through the rectum and gently manipulated under direct visualization to the cecal pole and the terminal ileum. The quality of the preparation was fair. Upon withdrawal of the colonoscope, careful visualization of the mucosa was performed.      Success Prep Score: Right Colon 1 Transverse colon 2 Left colon 2          Reason for consultation: IBD flare     HPI:  59 year old male with history of HLD presenting for flare of of rectosigmoid ulcerative colitis.  Patient with complicated course that has been complicated by anal fistula.  Patient has been having 7-9 bowel movements daily and underwent colonoscopy today.  Colonoscopy with Melchor 3 ulcerative colitis.  Decision was made to admit for IV steroids and possible initiation of IV Remicade.  Patient is been on Entyvio and was recently switched last year to Skyrizi.  Patient with hematochezia but no abdominal pain at this time.  Recent CT abdomen pelvis with signs of fistula but no abscess noted.     PMHx: [Past Medical History]    [Past Medical History]   Anesthesia complication     started twitching during sigmoidoscopy when lidocaine and propofol administered    Cellulitis     diagnosed 3 weeks ago, pt reports in between butt cheeks    Diarrhea, unspecified    Internal hemorrhoids    Irregular bowel habits     Perianal abscess    Ulcerative colitis (HCC)    Uncomfortable fullness after meals    Visual impairment    Wears glasses        PSHx: [Past Surgical History]    [Past Surgical History]        Procedure Laterality Date    Colonoscopy   02/2023    Colonoscopy        Other surgical history   01/01/2000     B knee scopes    Sigmoidoscopy,diagnostic            Medications: [Current Medications]    [Current Medications]   lactated ringers infusion   Intravenous Continuous    lactated ringers infusion   Intravenous Continuous    naloxone (Narcan) 0.4 MG/ML injection 0.08 mg  0.08 mg Intravenous Once PRN    sodium chloride 0.9% infusion   Intravenous Continuous    enoxaparin (Lovenox) 40 MG/0.4ML SUBQ injection 40 mg  40 mg Subcutaneous Daily    acetaminophen (Tylenol Extra Strength) tab 500 mg  500 mg Oral Q4H PRN    melatonin tab 3 mg  3 mg Oral Nightly PRN    ondansetron (Zofran) 4 MG/2ML injection 4 mg  4 mg Intravenous Q6H PRN        Allergies: [Allergies]    [Allergies]        Allergen Reactions    Lidocaine OTHER (SEE COMMENTS)       Twitching     Propofol OTHER (SEE COMMENTS)       twitching        SocHx:  [Short Social Hx on File]    [Short Social Hx on File]  Social History        Socioeconomic History    Marital status:    Tobacco Use    Smoking status: Former       Current packs/day: 1.00       Types: Cigarettes       Passive exposure: Past    Smokeless tobacco: Never    Tobacco comments:       Updated 1/17/25   Vaping Use    Vaping status: Never Used   Substance and Sexual Activity    Alcohol use: Yes       Alcohol/week: 6.0 - 12.0 standard drinks of alcohol       Types: 6 - 12 Cans of beer per week       Comment: Occasional    Drug use: No   Other Topics Concern    Caffeine Concern Yes       Comment: 1-2 cups of coffee every morning    Exercise Yes    Seat Belt Yes    Special Diet Yes       Comment: low carb diet     Stress Concern No    Weight Concern No           FamHx:  [Family History]    [Family  History]        Problem Relation Age of Onset    Heart Disorder Father           Valvular heart disease    Cancer Mother           pancreatic CA    Other (Healthy) Brother      Other (Hep C) Brother      Other (Healthy) Brother             ROS:  In addition to the pertinent positives described above:            Infectious Disease:  No chronic infections or recent fevers prior to the acute illness            Cardiovascular: No history of CAD, prior MI, chest pain, or palpitations            Respiratory: No shortness of breath, asthma, copd, recurrent pneumonia            Hematologic: The patient reports no easy bruising, frequent gum bleeding or nose bleeding;  The patient has no history of known chronic anemia            Dermatologic: The patient reports no recent rashes or chronic skin disorders            Rheumatologic: The patient reports no history of chronic arthritis, myalgias, arthralgias            Genitourinary:  The patient reports no history of recurrent urinary tract infections, hematuria, dysuria, or nephrolithiasis           Psychiatric: The patient reports no history of depression, anxiety, suicidal ideation, or homicidal ideation           Oncologic: The patient reports on history of prior solid tumor or hematologic malignancy           ENT: The patient reports no hoarseness of voice, hearing loss, sinus congestion, tinnitus           Neurologic: The patient reports no history of seizure, stroke, or frequent headaches        PE: /53   Pulse 64   Temp 98.5 °F (36.9 °C) (Temporal)   Resp 20   Ht 5' 9\" (1.753 m)   Wt 165 lb (74.8 kg)   SpO2 100%   BMI 24.37 kg/m²      Gen: AAO x 3, NAD   CV: Regular rate and rhythm  Resp: Clear to auscultation bilaterally  Abdomen: Soft, non-tender, non-distended with the presence of bowel sounds; No hepatosplenomegaly; no rebound or guarding; No ascites is clinically apparent; no tympany to percussion  Ext: No peripheral edema or cyanosis  Skin: Warm and  dry  Psychiatric: Appropriate mood and congruent affect without obvious depression or anxiety     Labs:   No results for input(s): \"GLU\", \"BUN\", \"CREATSERUM\", \"GFRAA\", \"GFRNAA\", \"CA\", \"NA\", \"K\", \"CL\", \"CO2\" in the last 168 hours.  No results for input(s): \"RBC\", \"HGB\", \"HCT\", \"MCV\", \"MCH\", \"MCHC\", \"RDW\", \"NEPRELIM\", \"WBC\", \"PLT\" in the last 168 hours.     No results for input(s): \"ALKPHOS\", \"BILITOT\", \"ALT\", \"AST\" in the last 168 hours.     Invalid input(s): \"BILIDIR\", \"PROT\", \"LABALBU\"       Assessment and Plan:   - Patient with active severe flare of ulcerative colitis; given presence of fistula, there has been some thought that his disease has progressed or changed into Crohn's  - Pathology from colonoscopy pending  - Would look to repeat C. difficile; this test was negative 4/28  - Would look to initiate IV Solu-Medrol 20 mg 3 times daily pending C. Difficile  - Would look to initiate IV Remicade while in house  - If no improvement in symptoms, would look to transfer to [facility]  - Would look to obtain MR pelvis while in house      Thank you for the consultation, we will follow the patient with you.     Delbert Valencia MD  1:08 PM  5/9/2025  [facility]  [phone]                     Electronically signed by Delbert Valencia MD at 5/9/2025  1:13 PM         Reason for Consultation:   Inflammatory bowel disease  Admitted after outpatient colonoscopy showed severe colitis  History of anal fistula     Subjective:   Patient is doing well today.  He is currently on IV steroids.  He did obtain a dose of Remicade yesterday.  His stools are having more bulk although still semisolid.  No abdominal pain.  No overt GI bleeding.  His appetite is good.     [Problem List]    [Problem List]  Patient Active Problem List      Diagnosis    Erectile dysfunction    Hypercholesterolemia    Angiodysplasia of colon without hemorrhage    Ulcerative (chronic) proctitis (HCC)    Osteopenia of multiple  sites    Bilateral chronic knee pain    Chronic ulcerative pancolitis, with rectal bleeding (HCC)    Exacerbation of ulcerative colitis with complication (HCC)    Diarrhea        PMH, PSH, Fhx, Shx as per initial consult note     Review of Systems     12 point Review of Systems negative unless otherwise mentioned in Subjective.      Physical Exam  /73 (BP Location: Right arm)   Pulse 54   Temp 97.2 °F (36.2 °C) (Oral)   Resp 17   Ht 5' 9\" (1.753 m)   Wt 165 lb (74.8 kg)   SpO2 97%   BMI 24.37 kg/m²   Body mass index is 24.37 kg/m².        Gen: No acute distress  Resp: no respiratory distress  Abd: Soft, non-tender, non-distended. No rebound tenderness, no guarding.   Neuro: Aox3.      Diagnostic Data:       Labs:       Recent Labs   Lab 05/09/25  1451 05/10/25  0658   WBC 5.7 7.0   HGB 14.7 14.7   MCV 88.8 90.4   .0 275.0              Recent Labs   Lab 05/09/25  1451 05/10/25  0658   GLU 78 169*   BUN 7* 9   CREATSERUM 0.74 0.80   CA 9.2 9.1   ALB 4.3  --     140   K 3.6 4.4    108   CO2 28.0 27.0   ALKPHO 58  --    AST 26  --    ALT 37  --    BILT 0.6  --    TP 6.8  --          No results for input(s): \"PTP\", \"INR\" in the last 168 hours.     No data recorded           Imaging: Imaging data reviewed in Epic.     Medications: [Scheduled Medications]    [Scheduled Medications]   enoxaparin  40 mg Subcutaneous Daily    methylPREDNISolone  20 mg Intravenous Q8H        Allergies:  [Allergies]    [Allergies]        Allergen Reactions    Lidocaine OTHER (SEE COMMENTS)       Twitching     Propofol OTHER (SEE COMMENTS)       twitching        Imaging:  I have personally reviewed all pertinent available imaging.        ASSESSMENT / PLAN:      Ameya Kelly is a 59 year old male with a history of inflammatory bowel disease, severe flare of ulcerative colitis although given presence of anal fistula, possibility of Crohn's disease has been raised.     Clinically improving on IV steroids.  IV  Remicade first dose May 9, 2025.  C. difficile negative        Recommendations:   Continue IV Solu-Medrol 20 mg IV every 8 hours  Follow-up pathology results from colonoscopy  S/p IV Remicade this admission  IVF, analgesia, anti-emetics per primary team  DVT ppx   MR pelvis        Medhat Oakley MD  [facility]                        Electronically signed by Medhat Oakley MD at 5/10/2025 10:50 AM

## 2025-05-14 NOTE — PAYOR COMM NOTE
--------------  DISCHARGE REVIEW    Payor: [employer] [facility]/HMO/POS/EPO  Subscriber #:  [id number]  Authorization Number: [id number]    Admit date: 25  Admit time:   2:02 PM  Discharge Date: 2025 12:05 PM     Admitting Physician: Delbert Valencia MD  Attending Physician:  No att. providers found  Primary Care Physician: Shruthi Downey MD          Discharge Summary Notes        Discharge Summary signed by Betty Orantes MD at 2025 10:48 PM       Author: Betty Orantes MD Specialty: HOSPITALIST, Internal Medicine Author Type: Physician    Filed: 2025 10:48 PM Date of Service: 2025 11:15 AM Status: Signed    : Betty Orantes MD (Physician)         [facility]  Discharge Summary    Ameya Kelly Patient Status:  Inpatient    1965 MRN [id number]   Location [facility] 3N-A Attending No att. providers found   Hosp Day # 2 PCP Shruthi Downey MD     Date of Admission: 2025    Date of Discharge: 2025     Disposition: Home or Self Care    Discharge Diagnosis:   Inflammatory bowel disease- ulcerative colitis exacerbation with colon ulcer and intractable diarrhea   history of prior perianal abscess    perianal fistula , possible Crohn's disease     Chief Complaint: Ulcerative colitis flareup with intractable diarrhea     History of Present Illness:   Ameya Kelly is a 59 year old male admitted with ulcerative colitis flareup with intractable diarrhea.  Patient had an elective colonoscopy by GI Dr. Valencia today which showed ulcerative colitis flareup with colon ulcers and was admitted directly after colonoscopy by GI for treatment of the ulcerative colitis flareup with IV steroids and Remicade.  Patient denies any abdominal pain at this time.  No nausea vomiting.  Denies any chest pain or shortness of breath.  Has had intractable diarrhea at home for which had GI workup according to patient.       Brief Synopsis:   Inflammatory bowel disease- ulcerative  colitis exacerbation with colon ulcer and intractable diarrhea   history of prior perianal abscess    perianal fistula , possible Crohn's disease   Seen by GI Dr. Valencia  Status post colonoscopy done on day of admission just prior to admission showed largely normal right colon mucosa despite fair prep with adherent yellow stool.  1 right-sided colonic ulcer in the transverse colon which was biopsied.  Descending colon and sigmoid colon unremarkable other than sigmoid colon ulcer at 40 cm.  Both descending colon and sigmoid colon ulcer biopsy by GI.  Melchor 3 colitis seen in rectal sigmoid region starting 35 cm with marked erythema, friability and ulceration.  Follow-up biopsy results which is still pending at this time  Advance soft diet as tolerated  Treated with IV steroids on IV Solu-Medrol 20 mg IV every 8 hours per GI  Treated on IV Remicade per GI  MRI pelvis ordered by GI, came back with Right-sided perianal fistula which appears to travel through the most distal aspect of the internal anal   sphincter and is distal to the external anal sphincter.  The fistula does have significant enhancement suggesting active inflammation. Mild wall thickening of the distal rectum can be seen with mild proctitis.   Patient improved clinically.  Diarrhea improved.   Discharge home today in stable condition after cleared by GI.  GI medications at the time of discharge per GI.  Follow-up with GI as outpatient as directed.  Follow-up with regular outpatient primary care physician Shruthi Downey MD within 1 week in office.        TCM Diagnosis at discharge from Hospital: Other: See above; still recommend for TCM follow-up    Lace+ Score: 22  59-90 High Risk  29-58 Medium Risk  0-28   Low Risk.     TCM Follow-Up Recommendation:Ameya Kelly   LACE < 29: Low Risk of readmission after discharge from the hospital; Still recommend for TCM follow-up.      PCP: Shruthi Downey MD    Consultations:   Consultants         Provider    Role Specialty     Delbert Valencia MD      Consulting Physician GASTROENTEROLOGY         Procedures during hospitalization:   None    Incidental or significant findings and recommendations (brief descriptions):  None    Lab/Test results pending at Discharge:   None      Discharge Medications:        Discharge Medications        START taking these medications        Instructions Prescription details   predniSONE 10 MG Tabs  Commonly known as: Deltasone      Take 1 tablet (10 mg total) by mouth daily. PREDNISONE TAPER - Prednisone 40mg daily, will discharge on prednisone taper w/ -Prednisone 40mg x 1 week, then decrease to, -Prednisone 30mg x 1 week, then decrease to, -Prednisone 20mg x 1 week, then decrease to, -Prednisone 15mg x 1 week, then decrease to, -Prednisone 10mg x 1 week, then decrease to, -Prednisone 5mg x 1 week.   Quantity: 190 tablet  Refills: 0            CONTINUE taking these medications        Instructions Prescription details   Sildenafil Citrate 100 MG Tabs  Commonly known as: Viagra      Take 1 tablet (100 mg total) by mouth daily as needed for Erectile Dysfunction.   Quantity: 30 tablet  Refills: 1            STOP taking these medications      PEG 3350-KCl-Na Bicarb-NaCl 420 g Solr  Commonly known as: NULYTELY        Skyrizi 360 MG/2.4ML Soct  Generic drug: Risankizumab-rzaa        SKYRIZI IV                  Where to Get Your Medications        These medications were sent to [id number] - [place], [place] - [address] [phone], [phone]  [address] [address]      Phone: [phone]   predniSONE 10 MG Tabs          [place] prescription monitoring program data reviewed in epic before prescribing narcotics/controlled substances: Not applicable as no narcotics prescribed    Follow up Visits: Follow-up with Shruthi Downey MD in 1 week    Delbert Valencia MD  [address] [place] [address] [phone]    Call  As needed    Shruthi Downey  MD [address] [facility] [id number]  [phone]    Schedule an appointment as soon as possible for a visit in 1 week(s)      Appointments for Next 30 Days 5/11/2025 - 6/10/2025        Date Arrival Time Visit Type Length Department Provider     5/22/2025  8:15 AM  EXAM - ESTABLISHED [4894] 15 min [facility]    Patient Instructions:         Location Instructions:     Masks are optional for all patients and visitors, unless otherwise indicated. Note: A $50 fee will be charged for missed appointments or same-day cancellations. Please provide 24 hours' notice if you need to cancel or reschedule your appointment.               5/29/2025  8:40 AM  FOLLOW-UP OV [68915] 20 min [facility],  [facility] Delbert Valencia MD    Patient Instructions:     Please arrive 15 minutes prior to your scheduled appointment time.                           Physical Exam:  /64 (BP Location: Right arm)   Pulse 56   Temp 97.7 °F (36.5 °C) (Oral)   Resp 18   Ht 5' 9\" (1.753 m)   Wt 165 lb (74.8 kg)   SpO2 96%   BMI 24.37 kg/m²   General: No acute distress  Respiratory: No wheezes, no rhonchi  Cardiovascular: S1, S2, regular rate and rhythm  Abdomen: Soft, Non-tender, non-distended, positive bowel sounds  Neuro: No new focal deficits.   Extremities: No edema       Discharge Condition: stable    Patient Discharge Instructions: See electronic chart      More than 30 minutes on discharge    Betty Orantes MD        Electronically signed by Betty Orantes MD on 5/11/2025 10:48 PM         REVIEWER COMMENTS

## 2025-05-16 ENCOUNTER — OFFICE VISIT (OUTPATIENT)
Dept: INTERNAL MEDICINE CLINIC | Facility: CLINIC | Age: 60
End: 2025-05-16
Payer: COMMERCIAL

## 2025-05-16 VITALS
HEART RATE: 68 BPM | TEMPERATURE: 98 F | SYSTOLIC BLOOD PRESSURE: 108 MMHG | WEIGHT: 167.38 LBS | DIASTOLIC BLOOD PRESSURE: 68 MMHG | OXYGEN SATURATION: 99 % | BODY MASS INDEX: 25 KG/M2 | RESPIRATION RATE: 12 BRPM

## 2025-05-16 DIAGNOSIS — K50.913 CROHN'S DISEASE WITH FISTULA, UNSPECIFIED GASTROINTESTINAL TRACT LOCATION (HCC): Primary | Chronic | ICD-10-CM

## 2025-05-16 DIAGNOSIS — M85.89 OSTEOPENIA OF MULTIPLE SITES: Chronic | ICD-10-CM

## 2025-05-16 DIAGNOSIS — E78.00 HYPERCHOLESTEROLEMIA: Chronic | ICD-10-CM

## 2025-05-16 DIAGNOSIS — Z12.5 SCREENING FOR MALIGNANT NEOPLASM OF PROSTATE: ICD-10-CM

## 2025-05-16 PROCEDURE — 99495 TRANSJ CARE MGMT MOD F2F 14D: CPT | Performed by: INTERNAL MEDICINE

## 2025-05-16 NOTE — PROGRESS NOTES
Subjective:   Ameya Kelly is a 59 year old male who presents for hospital follow up.   He was discharged from [facility] to Home or Self Care  Admission Date: 5/9/25   Discharge Date: 5/11/25  Hospital Discharge Diagnosis: Inflammatory bowel disease   history of prior perianal abscess    perianal fistula , possible Crohn's disease     Interactive contact within 2 business days post discharge first initiated on Date: 5/12/2025  I accessed [employer] and/or [facility] Everywhere and personally reviewed the following for the recent hospitalization: provider notes, consults, summaries, labs and other test results and the pertinent findings are documented below.     HPI:  Patient has been following with GI (Dr. JEFF Valencia) for his inflammatory bowel disease.  Last month he started having more diarrhea, hematochezia.  He had colonoscopy done.  This showed active colitis, colon ulcers.  He was admitted to the hospital.  Received IV steroids, IV Remicade.  CT scan showed anal fistula.  Patient was eventually discharged home.  He is doing better.  To have next Remicade treatment next week.  Has follow up appointments with General Surgery and GI.  He is waiting to see if [facility] is in network for his insurance - advised to follow up with them by his primary GI Dr. Valencia.     Other issues:  Hypercholesterolemia - lifestyle controlled.  Osteopenia - no recent falls/fractures.    History/Other:   Current Medications:  Medication Reconciliation:  I am aware of an inpatient discharge within the last 30 days.  The discharge medication list has been reconciled with the patient's current medication list and reviewed by me. See medication list for additions of new medication, and changes to current doses of medications and discontinued medications.  Active Meds, Sig Only[1]    Review of Systems:  GENERAL: weight stable, energy stable, no sweating  SKIN: denies any unusual skin lesions  EYES: denies blurred vision or double  vision  HEENT: denies nasal congestion, sinus pain or ST  LUNGS: denies shortness of breath with exertion  CARDIOVASCULAR: denies chest pain on exertion or palpitations  GI: denies abdominal pain; diarrhea improved  MUSCULOSKELETAL: denies pain, normal range of motion of extremities  NEURO: denies headaches, denies dizziness, denies weakness  PSYCHE: denies depression or anxiety  HEMATOLOGIC: denies hx of anemia, or bruising, denies bleeding  ENDOCRINE: denies thyroid history  ALL/ASTHMA: denies hx of allergy or asthma    Objective:   No LMP for male patient.  Estimated body mass index is 24.72 kg/m² as calculated from the following:    Height as of 5/9/25: 5' 9\" (1.753 m).    Weight as of this encounter: 167 lb 6.4 oz (75.9 kg).   /68 (BP Location: Right arm, Patient Position: Sitting, Cuff Size: adult)   Pulse 68   Temp 98.1 °F (36.7 °C) (Temporal)   Resp 12   Wt 167 lb 6.4 oz (75.9 kg)   SpO2 99%   BMI 24.72 kg/m²    GENERAL: well developed, well nourished, in no apparent distress  SKIN: no rashes, no suspicious lesions  HEENT: atraumatic, normocephalic, ears and throat are clear  EYES: PERRLA, EOMI, conjunctiva are clear  NECK: supple, no adenopathy, no bruits  CHEST: no chest tenderness  LUNGS: clear to auscultation  CARDIO: RRR without murmur  GI: good BS's, no masses, HSM or tenderness  MUSCULOSKELETAL: back is not tender, FROM of the extremities  EXTREMITIES: no cyanosis, clubbing or edema  NEURO: Oriented times three, cranial nerves are intact, motor and sensory are grossly intact    Assessment & Plan:   1. Crohn's disease with fistula, unspecified gastrointestinal tract location (HCC)  Patient had increasing diarrhea - this led to updated colonoscopy last month. This showed active colitis, colon ulcers.  Patient was admitted to [facility] for IV Remicade and IV steroids.  He was discharged home. Doing better.  To get next Remicade dose soon.  Has upcoming appointments with General Surgery  (Dr. Haines) and GI (Dr. Valencia).      2. Hypercholesterolemia  Lifestyle controlled.  Will check lipid panel.  - Lipid Panel; Future    3. Osteopenia of multiple sites  No recent falls/fractures.  Will check vitamin D levels.  Next DEXA scan due in 2026.  - Vitamin D, 25-Hydroxy; Future    4. Screening for malignant neoplasm of prostate  Will check PSA with next labs.  - PSA - Total [5363][Q]; Future      Return in about 8 months (around 1/16/2026).          [1]   Outpatient Medications Marked as Taking for the 5/16/25 encounter (Office Visit) with Shruthi Downey MD   Medication Sig    Ergocalciferol (VITAMIN D OR) Take 1 capsule by mouth in the morning.    CALCIUM OR Take 1 tablet by mouth in the morning.    IRON OR Take 1 tablet by mouth in the morning.    predniSONE 10 MG Oral Tab Take 1 tablet (10 mg total) by mouth daily. PREDNISONE TAPER - Prednisone 40mg daily, will discharge on prednisone taper w/ -Prednisone 40mg x 1 week, then decrease to, -Prednisone 30mg x 1 week, then decrease to, -Prednisone 20mg x 1 week, then decrease to, -Prednisone 15mg x 1 week, then decrease to, -Prednisone 10mg x 1 week, then decrease to, -Prednisone 5mg x 1 week.    Sildenafil Citrate (VIAGRA) 100 MG Oral Tab Take 1 tablet (100 mg total) by mouth daily as needed for Erectile Dysfunction.

## 2025-05-16 NOTE — PATIENT INSTRUCTIONS
- Continue follow up with specialists as scheduled   - May be worth checking with medical records department to see if you can get a CD of your CT scan and MRI images. You can find instructions for medical records at the [place] website:  [email or web address]    - Get blood tests done at [facility] when you are fasting (water and medications only for 8 hours).    - Can follow up with me next year for your annual physical; follow up earlier as needed.    It was a pleasure seeing you in the clinic today.  Thank you for choosing the [facility] [place] office for your healthcare needs. Please call at [phone] with any questions or concerns.    Shruthi Downey MD

## 2025-05-22 ENCOUNTER — OFFICE VISIT (OUTPATIENT)
Facility: LOCATION | Age: 60
End: 2025-05-22
Payer: COMMERCIAL

## 2025-05-22 VITALS
DIASTOLIC BLOOD PRESSURE: 72 MMHG | OXYGEN SATURATION: 96 % | HEART RATE: 60 BPM | SYSTOLIC BLOOD PRESSURE: 123 MMHG | TEMPERATURE: 98 F

## 2025-05-22 DIAGNOSIS — K50.913 CROHN'S DISEASE WITH FISTULA, UNSPECIFIED GASTROINTESTINAL TRACT LOCATION (HCC): Chronic | ICD-10-CM

## 2025-05-22 DIAGNOSIS — K60.30 ANAL FISTULA: Primary | ICD-10-CM

## 2025-05-22 NOTE — PROGRESS NOTES
The following individual(s) verbally consented to be recorded using ambient AI listening technology and understand that they can each withdraw their consent to this listening technology at any point by asking the clinician to turn off or pause the recording:    Patient name: Ameya JHOANA Kelly

## 2025-05-23 ENCOUNTER — OFFICE VISIT (OUTPATIENT)
Age: 60
End: 2025-05-23
Attending: INTERNAL MEDICINE
Payer: COMMERCIAL

## 2025-05-23 VITALS
BODY MASS INDEX: 25 KG/M2 | TEMPERATURE: 98 F | DIASTOLIC BLOOD PRESSURE: 78 MMHG | WEIGHT: 169.5 LBS | RESPIRATION RATE: 18 BRPM | HEART RATE: 56 BPM | OXYGEN SATURATION: 98 % | SYSTOLIC BLOOD PRESSURE: 122 MMHG

## 2025-05-23 DIAGNOSIS — K50.913 CROHN'S DISEASE WITH FISTULA (HCC): Primary | ICD-10-CM

## 2025-05-23 NOTE — PROGRESS NOTES
Pt here for inflectra infusion . Pt denies any issues or concerns.      Ordering Provider: Erik Moore Exp: 0 remaining      Pt tolerated infusion without difficulty or complaint. Reviewed next apt date/time: N/A      Education Record  Learner:  Patient  Disease / Diagnosis: Crohn's  Barriers / Limitations:  None  Method:  Brief focused and Discussion  General Topics:  Medication and Plan of care reviewed  Outcome:  Shows understanding     Patient  dc'd home in stable condition. Per patient , he will receive his next infusion at another center in [place].

## 2025-05-24 NOTE — PROCEDURES
Procedure: Anoscopy    Surgeon: William Haines    Anesthesia: None    Findings: See the progress note attached for all findings    Operative Summary: The patient was placed in a prone position on the proctoscopy table, the hips were flexed in the jackknife position.    Using a well-lubricated finger, digital rectal exam was performed in anticipation of the anoscope.    The anoscope was then inserted under direct visualization.    Excellent visualization was performed in a 360° fashion.    The scope was removed. The patient was taken out of the prone, jackknife, position.     The patient tolerated the procedure well.

## 2025-05-24 NOTE — PROGRESS NOTES
Follow Up Visit Note    The following individual(s) verbally consented to be recorded using ambient AI listening technology and understand that they can each withdraw their consent to this listening technology at any point by asking the clinician to turn off or pause the recording:    Patient name: Ameya Kelly     Active Problems      1. Anal fistula    2. Crohn's disease with fistula, unspecified gastrointestinal tract location (HCC)          Chief Complaint   Chief Complaint   Patient presents with    [facility]     EP- 1 year f/u anal fistula. Pt was hospitalized two weeks ago with a chrons flare. Pt is doing better now. Pt reported the area was draining with blood and pus but this has resolved. Pt denies any pain coming from the area. Pt reports it could be uncomfortable at times, but never felt pain or a burning sensation. Pt reports his BM's are back to normal.          History of Present Illness  History of Present Illness  Ameya Kelly is a 59 year old male with Crohn's disease who presents after being hospitalized with a recent flare-up and drainage from a suspected anal fistula. Patient follows with Dr. Jordy Valencia.    Patient was hospitalized with a IBD flare from 5/9/2025 - 5/11/2025 with primary symptom being intractable diarrhea.  The fistula was draining blood, pus and even fecal material, but it has since sealed up after hospitalization and treatment.  He is currently being treated with a steroid taper and has been started on Remicade. Prior to this, he was on Entyvio for about two years, which stopped working around [place] of 2024. He then switched to Skyrizi, but seems to be ineffective given his recent flare and hospitalization.    Patient's bowel movements have returned to normal, with two to three solid stools per day. No pain associated with his fistula and no history of anorectal surgery.  No drainage or incontinence.         Allergies  Ameya is allergic to lidocaine and  propofol.    Past Medical / Surgical / Social / Family History    The past medical and past surgical history have been reviewed by me today.    Past Medical History[1]  Past Surgical History[2]    The family history and social history have been reviewed by me today.    Family History[3]  Social Hx on file[4]   Medications - Current[5]     Review of Systems  A 10 point review of systems was performed and negative unless otherwise documented per HPI.     Physical Findings   /72 (BP Location: Right arm, Patient Position: Sitting, Cuff Size: adult)   Pulse 60   Temp 97.9 °F (36.6 °C) (Temporal)   SpO2 96%   Physical Exam  Vitals and nursing note reviewed. Exam conducted with a chaperone present.   Constitutional:       General: He is not in acute distress.  HENT:      Head: Normocephalic and atraumatic.      Mouth/Throat:      Mouth: Mucous membranes are moist.   Cardiovascular:      Rate and Rhythm: Normal rate and regular rhythm.   Pulmonary:      Effort: Pulmonary effort is normal.   Abdominal:      General: There is no distension.      Palpations: Abdomen is soft.      Tenderness: There is no abdominal tenderness.   Genitourinary:     Comments: Patient examined in the prone jackknife position with nurse chaperone present.  There is a small scar in the right anterior anoderm around 3 cm from the anal verge.  No expressible drainage.  There is suggestion of a palpable cord tracking towards the anus.  There is no surrounding tenderness or skin changes.  Digital rectal exam shows good tone without any masses, bleeding, drainage or tenderness.  No obviously palpable internal opening.  Anoscopy reveals small internal hemorrhoids with mild inflammation of the distal rectal mucosa.  No visible bleeding or drainage.  Musculoskeletal:         General: No deformity.   Skin:     General: Skin is warm and dry.   Neurological:      General: No focal deficit present.      Mental Status: He is alert.   Psychiatric:          Mood and Affect: Mood normal.         Results  RADIOLOGY  I have personally reviewed the imaging of patient's recent CT scan of the abdomen and pelvis from 5/1/2025 in addition to his MRI pelvis from 5/10/2025.   MRI CONCLUSION:       1. Right-sided perianal fistula which appears to travel through the most distal aspect of the internal anal   sphincter and is distal to the external anal sphincter.  The fistula does have significant enhancement suggesting active inflammation.      2. Mild wall thickening of the distal rectum can be seen with mild proctitis.     DIAGNOSTIC  I have also reviewed the endoscopic images from patient's most recent colonoscopy on 5/9/2025       Assessment & Plan  Crohn's disease with anal fistula  The anal fistula was active with drainage 2-3 weeks ago but has since sealed. No current drainage or passage of stool through the fistula. The fistula seems associated with Crohn's disease/IBD. He is on prednisone and has started Remicade, which is expected to aid in fistula management. Surgical intervention is not recommended due to the healed state of the fistula and potential for spontaneous closure with medical management. Remicade has the most data supporting its efficacy for fistula closure  - Continue Remicade infusions as scheduled  - Monitor for recurrence of symptoms or drainage  - Schedule follow-up in three months to assess fistula status  - Advise him to contact the office if symptoms recur or drainage occurs          No orders of the defined types were placed in this encounter.      Imaging & Referrals   None    Follow Up  No follow-ups on file.    William Haines MD           [1]   Past Medical History:   Anesthesia complication    started twitching during sigmoidoscopy when lidocaine and propofol administered    Cellulitis    diagnosed 3 weeks ago, pt reports in between butt cheeks    Crohn's disease (HCC)    Diarrhea, unspecified    Internal hemorrhoids    Irregular bowel habits     Perianal abscess    Ulcerative colitis (HCC)    Uncomfortable fullness after meals    Visual impairment    Wears glasses   [2]   Past Surgical History:  Procedure Laterality Date    Colonoscopy  02/2023    Colonoscopy      Colonoscopy N/A 5/9/2025    Procedure: COLONOSCOPY WITH BIOPSIES;  Surgeon: Delbert Valencia MD;  Location:  ENDOSCOPY    Other surgical history  01/01/2000    B knee scopes    Sigmoidoscopy,diagnostic     [3]   Family History  Problem Relation Age of Onset    Heart Disorder Father         Valvular heart disease    Cancer Mother         pancreatic CA    Other (Healthy) Brother     Other (Hep C) Brother     Other (Healthy) Brother    [4]   Social History  Socioeconomic History    Marital status:    Tobacco Use    Smoking status: Former     Current packs/day: 1.00     Types: Cigarettes     Passive exposure: Past    Smokeless tobacco: Never    Tobacco comments:     Updated 1/17/25   Vaping Use    Vaping status: Never Used   Substance and Sexual Activity    Alcohol use: Yes     Alcohol/week: 6.0 - 12.0 standard drinks of alcohol     Types: 6 - 12 Cans of beer per week     Comment: Social    Drug use: No   Other Topics Concern    Caffeine Concern No    Exercise Yes    Seat Belt Yes    Special Diet Yes    Stress Concern No    Weight Concern No   [5]   Current Outpatient Medications:     Ergocalciferol (VITAMIN D OR), Take 1 capsule by mouth in the morning., Disp: , Rfl:     CALCIUM OR, Take 1 tablet by mouth in the morning., Disp: , Rfl:     IRON OR, Take 1 tablet by mouth in the morning., Disp: , Rfl:     predniSONE 10 MG Oral Tab, Take 1 tablet (10 mg total) by mouth daily. PREDNISONE TAPER - Prednisone 40mg daily, will discharge on prednisone taper w/ -Prednisone 40mg x 1 week, then decrease to, -Prednisone 30mg x 1 week, then decrease to, -Prednisone 20mg x 1 week, then decrease to, -Prednisone 15mg x 1 week, then decrease to, -Prednisone 10mg x 1 week, then decrease to, -Prednisone 5mg  x 1 week., Disp: 190 tablet, Rfl: 0    Sildenafil Citrate (VIAGRA) 100 MG Oral Tab, Take 1 tablet (100 mg total) by mouth daily as needed for Erectile Dysfunction., Disp: 30 tablet, Rfl: 1

## 2025-05-25 LAB
CHOL/HDLC RATIO: 2.7 (CALC)
CHOLESTEROL, TOTAL: 196 MG/DL
HDL CHOLESTEROL: 73 MG/DL
LDL-CHOLESTEROL: 108 MG/DL (CALC)
NON-HDL CHOLESTEROL: 123 MG/DL (CALC)
PSA, TOTAL: 0.4 NG/ML
TRIGLYCERIDES: 64 MG/DL
VITAMIN D, 25-OH, TOTAL: 40 NG/ML (ref 30–100)

## 2025-07-23 ENCOUNTER — OFFICE VISIT (OUTPATIENT)
Dept: ORTHOPEDICS CLINIC | Facility: CLINIC | Age: 60
End: 2025-07-23
Payer: COMMERCIAL

## 2025-07-23 DIAGNOSIS — M17.0 PRIMARY OSTEOARTHRITIS OF BOTH KNEES: Primary | ICD-10-CM

## 2025-07-23 PROCEDURE — 20610 DRAIN/INJ JOINT/BURSA W/O US: CPT | Performed by: ORTHOPAEDIC SURGERY

## 2025-07-23 RX ORDER — TRIAMCINOLONE ACETONIDE 40 MG/ML
80 INJECTION, SUSPENSION INTRA-ARTICULAR; INTRAMUSCULAR ONCE
Status: COMPLETED | OUTPATIENT
Start: 2025-07-23 | End: 2025-07-23

## 2025-07-23 RX ADMIN — TRIAMCINOLONE ACETONIDE 80 MG: 40 INJECTION, SUSPENSION INTRA-ARTICULAR; INTRAMUSCULAR at 11:28:00

## 2025-07-23 NOTE — PROCEDURES
Great relief from the last knee injections for 6 months, would like to repeat today.    Risks and benefits of knee injection discussed with the patient, with risks including but not limited to pain and swelling at the injection site and/or within the knee joint, infection, elevation in blood pressure and/or glucose levels, facial flushing.  After informed consent, the patient's right and left knees were marked, locally anesthetized with skin refrigerant, prepped with topical antiseptic, and injected with a mixture of 1mL 40mg/mL Kenalog, 2mL 1% lidocaine and 2mL 0.5% marcaine through the inferolateral portal.  A band-aid was applied.  The patient tolerated the procedure well.    Antwan Clark MD, FAAOS  [facility]  Phone [phone]  Fax [phone]

## 2025-08-05 PROBLEM — K51.018 ULCERATIVE (CHRONIC) PANCOLITIS WITH OTHER COMPLICATION (HCC): Status: ACTIVE | Noted: 2025-08-05

## (undated) DEVICE — 3M™ RED DOT™ MONITORING ELECTRODE WITH FOAM TAPE AND STICKY GEL, 50/BAG, 20/CASE, 72/PLT 2570: Brand: RED DOT™

## (undated) DEVICE — 1200CC GUARDIAN II: Brand: GUARDIAN

## (undated) DEVICE — KIT VLV 5 PC AIR H2O SUCT BX ENDOGATOR CONN

## (undated) DEVICE — V2 SPECIMEN COLLECTION MANIFOLD KIT: Brand: NEPTUNE

## (undated) DEVICE — 10FT COMBINED O2 DELIVERY/CO2 MONITORING. FILTER WITH MICROSTREAM TYPE LUER: Brand: DUAL ADULT NASAL CANNULA

## (undated) DEVICE — GIJAW SINGLE-USE BIOPSY FORCEPS WITH NEEDLE: Brand: GIJAW

## (undated) DEVICE — KIT CUSTOM ENDOPROCEDURE STERIS

## (undated) NOTE — LETTER
Date & Time: 3/8/2023, 12:56 PM  Patient: Siddhartha Lorenz  Encounter Provider(s):    JO ANN Fam       To Whom It May Concern:    Siddhartha Lorenz was seen and treated in our department on 3/8/2023. He should not return to work until symptoms improve. If you have any questions or concerns, please do not hesitate to call.         Ancil Door FNP-C

## (undated) NOTE — LETTER
23    Patient: Jamaica Mccloud  : 1965 Visit date: 3/16/2023    Dear  Narda Anaya MD    Thank you for referring Jamaica Mccloud to my practice. Please find my assessment and plan below. Assessment   Perianal abscess  (primary encounter diagnosis)    This is a very nice 70-year-old gentleman who has recently been diagnosed and treated for ulcerative colitis and is being referred to me with concern for a chronic perianal abscess. Patient is under the care of Dr. Ronnell Reynolds of [facility] gastroenterology. Patient began having progressively worsening bloody diarrhea beginning in the 2020. He underwent a colonoscopy and 2023 which revealed ulcerative rectosigmoid inflammation from the anal verge to 25 cm. He was given a diagnosis of ulcerative colitis. He was hospitalized shortly after that from 3/10/2023-3/13/2023 with a UC flare. He was treated with IV steroids and discharged home with a oral steroid course. Beginning in 2022, patient began having intermittent pain and swelling on the right side of his anus. The area became most painful in early March and then began spontaneously draining on 3/8/2023. He describes the drainage as bloody initially then serous and now has stopped again over the last 1 week. He did not require any incision and drainage procedure. Patient denies any previous history of anorectal surgeries or infections. He denies any anorectal pain, drainage or incontinence. He is now having approximately 5-8 bowel movements a day that are becoming more formed with little blood. On exam today, patient does have a small skin opening in the right anterior anoderm about 3 cm from the anal verge where he had a spontaneously draining abscess. His history is suggestive of a possible anal fistula. However, he is completely asymptomatic at this time. Plan  I recommend ongoing watchful observation for now.   I advised him to see me should the area begin to cause pain, irritation or drainage. If he does develop recurrent symptoms, I would plan for exam under anesthesia with anoscopy with likely seton placement if an anal fistula is found. I do not think it makes sense to take him to surgery at this time given he is completely asymptomatic. If an anal fistula was found in the future, this would raise my suspicion that he did actually could have Crohn's disease rather than ulcerative colitis. He would likely need to be started on biologic therapy before any attempt at definitive fistula repair is considered, especially if there is active proctitis. Patient expressed understanding and is agreeable to plan. He has ongoing follow-up with his gastroenterologist, Dr. Reynaldo Allan. He will contact me or my office if the area begins to bother him again in the future.       Sincerely,       Nicolas Hartman MD   CC: Patrick Stephenson MD

## (undated) NOTE — LETTER
24    Patient: Ameya Kelly  : 1965 Visit date: 2024    Dear  Delbert Valencia MD    Thank you for referring Ameya Kelly to my practice.  Please find my assessment and plan below.    Assessment   1. Perianal abscess    2. Anal fistula        This is a very nice 58-year-old gentleman with history of ulcerative colitis who I previously met 3/16/2023 when he was referred to me with concern for perianal abscess, possible anal fistula.  Patient is under the care of Dr. Delbert Valencia of [facility] gastroenterology.  When I first met the patient, there was a small skin opening in the right anterior anoderm about 3 cm from the anal verge where he had a spontaneously draining abscess.  I suspected he had an anal fistula at that time but was completely asymptomatic.  Therefore, we decided to pursue watchful observation.    Patient remained asymptomatic when I last saw him in 2023.  He has maintained care with Dr. Valencia and is currently on Entyvio.  His most recent flexible sigmoidoscopy was performed on 2024 and showed mild Melchor 0-1 inflammation of the sigmoid colon and rectum.  Pathology from biopsies of the sigmoid colon and rectum showed features consistent with chronic inactive (quiescent) colitis.    Patient returns for follow-up today with concern for recurrent perianal abscess and possible fistula.  He has noticed swelling around his anus since the flexible sigmoidoscopy procedure.  It was not painful or draining until this last weekend.  He had increasing pain, swelling and redness over the last several days.  The area spontaneously opened today around 1.5 hours before being seen by me in the office this morning.  He describes the drainage as a mixture of blood and pus.  He did have pain relief with the drainage.  No fevers, urinary symptoms or change in bowel habits.    On exam today, there is a small skin opening in the right anterior anoderm around 3 cm from the anal verge.  I  was able to express a small amount of blood-tinged serous fluid from this opening.  There is suggestion of a palpable cord tracking towards the anus.  There is no surrounding tenderness or skin changes.  Digital rectal exam reveals suggestion of an internal opening in the anterior midline anal canal within 1 cm of the anal verge.  No active bleeding, drainage or tenderness.    Plan  I counseled patient and his wife that I do suspect he has an anal fistula based on the fact that he has had recurrent small perianal abscesses with swelling and drainage in the same location.  That being said, the fistula has remained quiescent for around 1 year and even now his symptoms are quite minimal.  I discussed options of watchful observation versus surgical intervention with the patient and his wife.  Patient is reluctant to have surgery for the anal fistula given the fact that his symptoms are mild and infrequent.  Patient's wife also has had a history of anal fistulas requiring multiple operations.  She seemed to have a bad experience with her fistula surgeries and has advised him to not have surgery if his symptoms remain mild and infrequent.    I do think it is reasonable to watchfully observe the patient for now.  I would like to see him back in 1 month's time for ongoing follow-up.  He is welcome to see me sooner if he has worsening anorectal symptoms in the interim.  Patient and wife expressed understanding and were agreeable to plan.       Sincerely,       William Haines MD   CC:   No Recipients

## (undated) NOTE — LETTER
04/27/20  To Whom It May Concern,    Mr. Britton Gitelman is my patient. He's been evaluated via Telemedicine for a medical condition. He is stable, good health. He may return to work on 4/28.  The only thing I ask is that Oriana Prabhakar has the option to wear a face

## (undated) NOTE — LETTER
Date: 8/2/2019    Patient Name: Monik Zacarias          To Whom it may concern: This letter has been written at the patient's request. The above patient was seen at the [facility] for treatment of a medical condition.     This patient should

## (undated) NOTE — Clinical Note
Hi Dr. Downey, pt feeling better since discharge.  Has scheduled visit with you.  Thank you, Tamara

## (undated) NOTE — LETTER
23    Patient: Megha Hernandez  : 1965 Visit date: 2023    Dear  Zina Stein MD    Thank you for referring Megha Hernandez to my practice. Please find my assessment and plan below. Assessment   Perianal abscess  (primary encounter diagnosis)    This is a very nice 51-year-old gentleman with history of ulcerative colitis who I previously met 3/16/2023 when he was referred to me with concern for perianal abscess, possible fistula. Patient is under the care of Dr. Zina Stein of [facility] gastroenter[facility]. When I first met the patient, there was a small skin opening in the right anterior anoderm about 3 cm from the anal verge where he had a spontaneously draining abscess. I suspected he had an anal fistula at that time but was completely asymptomatic. Therefore, we decided to pursue watchful observation. Patient has had 2 episodes of drainage around his anus since March. Last time he saw drainage was about 2 weeks ago. He denies any anorectal pain. The drainage is described as pink with a little bit of blood. He is having 4-6 formed bowel movements a day. He denies any blood in the stool. He is being treated with Entyvio currently. On exam today, there is a small area of persistent induration in the right anterior anoderm extending out to about 3 cm from the anal verge. There is no skin opening in this area. There is no tenderness, erythema or fluctuance. There is a small opening in the anterior midline anoderm at the level of the anal verge. No active drainage. No palpable cord. Plan  I recommend ongoing watchful observation for now. I advised him to see me should the area begin to cause pain, irritation or drainage. If he does develop recurrent symptoms, I would plan for exam under anesthesia with anoscopy with likely seton placement if an anal fistula is found.  I do not think it makes sense to take him to surgery at this time given he is completely asymptomatic. If an anal fistula was found in the future, this would raise my suspicion that he did actually could have Crohn's disease rather than ulcerative colitis. He would possibly need to be switched to an anti-TNF alpha biologic if this were the case. Patient expressed understanding and is agreeable to plan. He has ongoing follow-up with his gastroenterologist, Dr. Chary Vargas. He will contact me or my office if the area begins to bother him again in the future.        Sincerely,       Jayro Way MD   CC:   No Recipients